# Patient Record
Sex: MALE | Race: WHITE | Employment: OTHER | URBAN - METROPOLITAN AREA
[De-identification: names, ages, dates, MRNs, and addresses within clinical notes are randomized per-mention and may not be internally consistent; named-entity substitution may affect disease eponyms.]

---

## 2017-10-10 ENCOUNTER — HOSPITAL ENCOUNTER (OUTPATIENT)
Age: 82
Setting detail: OUTPATIENT SURGERY
Discharge: HOME | End: 2017-10-10
Attending: COLON & RECTAL SURGERY | Admitting: COLON & RECTAL SURGERY
Payer: MEDICARE

## 2017-10-10 ENCOUNTER — ANESTHESIA EVENT (OUTPATIENT)
Dept: ENDOSCOPY | Age: 82
End: 2017-10-10
Payer: MEDICARE

## 2017-10-10 ENCOUNTER — ANESTHESIA (OUTPATIENT)
Dept: ENDOSCOPY | Age: 82
End: 2017-10-10
Payer: MEDICARE

## 2017-10-10 VITALS
WEIGHT: 243.56 LBS | OXYGEN SATURATION: 94 % | DIASTOLIC BLOOD PRESSURE: 71 MMHG | RESPIRATION RATE: 14 BRPM | TEMPERATURE: 97.6 F | HEIGHT: 75 IN | SYSTOLIC BLOOD PRESSURE: 125 MMHG | BODY MASS INDEX: 30.28 KG/M2 | HEART RATE: 89 BPM

## 2017-10-10 PROCEDURE — 77030009426 HC FCPS BIOP ENDOSC BSC -B: Performed by: COLON & RECTAL SURGERY

## 2017-10-10 PROCEDURE — 74011000250 HC RX REV CODE- 250

## 2017-10-10 PROCEDURE — 88305 TISSUE EXAM BY PATHOLOGIST: CPT | Performed by: COLON & RECTAL SURGERY

## 2017-10-10 PROCEDURE — 74011250636 HC RX REV CODE- 250/636

## 2017-10-10 PROCEDURE — 76060000031 HC ANESTHESIA FIRST 0.5 HR: Performed by: COLON & RECTAL SURGERY

## 2017-10-10 PROCEDURE — 74011250636 HC RX REV CODE- 250/636: Performed by: COLON & RECTAL SURGERY

## 2017-10-10 PROCEDURE — 76040000019: Performed by: COLON & RECTAL SURGERY

## 2017-10-10 RX ORDER — EPINEPHRINE 0.1 MG/ML
1 INJECTION INTRACARDIAC; INTRAVENOUS
Status: DISCONTINUED | OUTPATIENT
Start: 2017-10-10 | End: 2017-10-10 | Stop reason: HOSPADM

## 2017-10-10 RX ORDER — ATROPINE SULFATE 0.1 MG/ML
0.5 INJECTION INTRAVENOUS
Status: DISCONTINUED | OUTPATIENT
Start: 2017-10-10 | End: 2017-10-10 | Stop reason: HOSPADM

## 2017-10-10 RX ORDER — PROPOFOL 10 MG/ML
INJECTION, EMULSION INTRAVENOUS AS NEEDED
Status: DISCONTINUED | OUTPATIENT
Start: 2017-10-10 | End: 2017-10-10 | Stop reason: HOSPADM

## 2017-10-10 RX ORDER — SODIUM CHLORIDE 0.9 % (FLUSH) 0.9 %
5-10 SYRINGE (ML) INJECTION EVERY 8 HOURS
Status: DISCONTINUED | OUTPATIENT
Start: 2017-10-10 | End: 2017-10-10 | Stop reason: HOSPADM

## 2017-10-10 RX ORDER — SODIUM CHLORIDE 0.9 % (FLUSH) 0.9 %
5-10 SYRINGE (ML) INJECTION AS NEEDED
Status: DISCONTINUED | OUTPATIENT
Start: 2017-10-10 | End: 2017-10-10 | Stop reason: HOSPADM

## 2017-10-10 RX ORDER — FLUMAZENIL 0.1 MG/ML
0.2 INJECTION INTRAVENOUS
Status: DISCONTINUED | OUTPATIENT
Start: 2017-10-10 | End: 2017-10-10 | Stop reason: HOSPADM

## 2017-10-10 RX ORDER — DEXTROMETHORPHAN/PSEUDOEPHED 2.5-7.5/.8
1.2 DROPS ORAL
Status: DISCONTINUED | OUTPATIENT
Start: 2017-10-10 | End: 2017-10-10 | Stop reason: HOSPADM

## 2017-10-10 RX ORDER — SODIUM CHLORIDE 9 MG/ML
50 INJECTION, SOLUTION INTRAVENOUS CONTINUOUS
Status: DISCONTINUED | OUTPATIENT
Start: 2017-10-10 | End: 2017-10-10 | Stop reason: HOSPADM

## 2017-10-10 RX ORDER — LIDOCAINE HYDROCHLORIDE 20 MG/ML
INJECTION, SOLUTION EPIDURAL; INFILTRATION; INTRACAUDAL; PERINEURAL AS NEEDED
Status: DISCONTINUED | OUTPATIENT
Start: 2017-10-10 | End: 2017-10-10 | Stop reason: HOSPADM

## 2017-10-10 RX ORDER — METOPROLOL SUCCINATE 25 MG/1
25 TABLET, EXTENDED RELEASE ORAL 2 TIMES DAILY
COMMUNITY
End: 2019-07-03 | Stop reason: DRUGHIGH

## 2017-10-10 RX ORDER — NALOXONE HYDROCHLORIDE 0.4 MG/ML
0.4 INJECTION, SOLUTION INTRAMUSCULAR; INTRAVENOUS; SUBCUTANEOUS
Status: DISCONTINUED | OUTPATIENT
Start: 2017-10-10 | End: 2017-10-10 | Stop reason: HOSPADM

## 2017-10-10 RX ADMIN — SODIUM CHLORIDE 50 ML/HR: 900 INJECTION, SOLUTION INTRAVENOUS at 12:53

## 2017-10-10 RX ADMIN — LIDOCAINE HYDROCHLORIDE 40 MG: 20 INJECTION, SOLUTION EPIDURAL; INFILTRATION; INTRACAUDAL; PERINEURAL at 13:34

## 2017-10-10 RX ADMIN — PROPOFOL 180 MG: 10 INJECTION, EMULSION INTRAVENOUS at 13:50

## 2017-10-10 NOTE — BRIEF OP NOTE
BRIEF OPERATIVE NOTE    Date of Procedure: 10/10/2017   Preoperative Diagnosis: HX COLON POLYPS  Postoperative Diagnosis: diverticulosis, colon polyp    Procedure(s):  COLONOSCOPY  COLON BIOPSY  Surgeon(s) and Role:     * Brayan Mcmahon MD - Primary         Assistant Staff:       Surgical Staff:  Endoscopy Technician-1: Tegan Armas  Endoscopy RN-1: Alley Houser RN  No case tracking events are documented in the log.   Anesthesia: MAC   Estimated Blood Loss: none  Specimens:   ID Type Source Tests Collected by Time Destination   1 : Sigmoid Colon Polyp bx Preservative Sigmoid  Brayan Mcmahon MD 10/10/2017 1347 Pathology      Findings: sigmoid diverticulosis and polyp   Complications: none apparent  Implants: * No implants in log *

## 2017-10-10 NOTE — PROGRESS NOTES
Endoscope was pre-cleaned at the bedside immediately following procedure by [facility].

## 2017-10-10 NOTE — ANESTHESIA PREPROCEDURE EVALUATION
Anesthetic History   No history of anesthetic complications            Review of Systems / Medical History  Patient summary reviewed, nursing notes reviewed and pertinent labs reviewed    Pulmonary  Within defined limits                 Neuro/Psych   Within defined limits           Cardiovascular    Hypertension        Dysrhythmias : atrial flutter      Exercise tolerance: >4 METS     GI/Hepatic/Renal               Comments: HX COLON POLYPS Endo/Other        Obesity     Other Findings   Comments: Nocturia   Hx skin cancer          Physical Exam    Airway  Mallampati: II  TM Distance: 4 - 6 cm  Neck ROM: normal range of motion   Mouth opening: Normal     Cardiovascular  Regular rate and rhythm,  S1 and S2 normal,  no murmur, click, rub, or gallop             Dental    Dentition: Full upper dentures and Lower partial plate     Pulmonary  Breath sounds clear to auscultation               Abdominal  GI exam deferred       Other Findings            Anesthetic Plan    ASA: 3  Anesthesia type: total IV anesthesia          Induction: Intravenous  Anesthetic plan and risks discussed with: Patient

## 2017-10-10 NOTE — IP AVS SNAPSHOT
[address] [place] [address] 
[phone] Patient: Catherine Hdez MRN: [id number] ZSM:[id number] You are allergic to the following Allergen Reactions Aspirin Other (comments) Pt states not allergic to it. Recent Documentation Height Weight BMI Smoking Status 1.905 m 110.5 kg 30.44 kg/m2 Never Smoker Emergency Contacts Name Discharge Info Relation Home Work Mobile Peggy Salguero DISCHARGE CAREGIVER [3] Spouse [3] [phone] About your hospitalization You were admitted on:  October 10, 2017 You last received care in the:  [facility] You were discharged on:  October 10, 2017 Unit phone number:  [phone] Why you were hospitalized Your primary diagnosis was:  Not on File Providers Seen During Your Hospitalizations Provider Role Specialty Primary office phone Xi Wakefield MD Attending Provider Colon and Rectal Surgery [phone] Your Primary Care Physician (PCP) Primary Care Physician Office Phone Office Fax [facility] [phone] Follow-up Information Follow up With Details Comments [place]   [address] [address] 
[phone] Current Discharge Medication List  
  
CONTINUE these medications which have NOT CHANGED Dose & Instructions Dispensing Information Comments Morning Noon Evening Bedtime  
 aspirin delayed-release 81 mg tablet Your last dose was: Your next dose is:    
   
   
 Dose:  81 mg Take 81 mg by mouth daily. Refills:  0  
     
   
   
   
  
 metoprolol succinate 25 mg XL tablet Commonly known as:  TOPROL-XL Your last dose was: Your next dose is: Take  by mouth daily. Refills:  0 Discharge Instructions Gerald Marienlli 
[id number] 
1935 COLON DISCHARGE INSTRUCTIONS Discomfort: 
Redness at IV site- apply warm compress to area; if redness or soreness persist- contact your physician There may be a slight amount of blood passed from the rectum Gaseous discomfort- walking, belching will help relieve any discomfort You may not operate a vehicle for 12 hours You may not engage in an occupation involving machinery or appliances for rest of today You may not drink alcoholic beverages for at least 12 hours Avoid making any critical decisions for at least 24 hour DIET: 
 High fiber diet.  however -  remember your colon is empty and a heavy meal will produce gas. Avoid these foods:  vegetables, fried / greasy foods, carbonated drinks for today MEDICATIONS: 
Avoid blood thinners for one week ACTIVITY: 
You may resume your normal daily activities it is recommended that you spend the remainder of the day resting -  avoid any strenuous activity. CALL M.D. ANY SIGN OF: Increasing pain, nausea, vomiting Abdominal distension (swelling) New increased bleeding (oral or rectal) Fever (chills) Follow-up Instructions: 
 Call Leah Kay MD if any questions or problems. Telephone # [phone] Biopsy results will be available in  7 to10 days Should have a repeat colonoscopy in 5 years. COLONOSCOPY FINDINGS: 
Your colonoscopy showed: sigmoid diverticulosis and polyp. "[employer] Activation Thank you for requesting access to "[employer]. Please follow the instructions below to securely access and download your online medical record. "[employer] allows you to send messages to your doctor, view your test results, renew your prescriptions, schedule appointments, and more. How Do I Sign Up? 1. In your internet browser, go to [email or web address] 
2. Click on the First Time User? Click Here link in the Sign In box. You will be redirect to the New Member Sign Up page. 3. Enter your [employer] Access Code exactly as it appears below. You will not need to use this code after youve completed the sign-up process. If you do not sign up before the expiration date, you must request a new code. [employer] Access Code: [email or web address] Expires: 2018 11:56 AM (This is the date your [employer] access code will ) 4. Enter the last four digits of your Social Security Number (xxxx) and Date of Birth (mm/dd/yyyy) as indicated and click Submit. You will be taken to the next sign-up page. 5. Create a [employer]o ID. This will be your [employer] login ID and cannot be changed, so think of one that is secure and easy to remember. 6. Create a [employer] password. You can change your password at any time. 7. Enter your Password Reset Question and Answer. This can be used at a later time if you forget your password. 8. Enter your e-mail address. You will receive e-mail notification when new information is available in [address] 9. Click Sign Up. You can now view and download portions of your medical record. 10. Click the Download Summary menu link to download a portable copy of your medical information. Additional Information If you have questions, please visit the Frequently Asked Questions section of the [employer] website at [email or web address] Remember, [employer] is NOT to be used for urgent needs. For medical emergencies, dial 911. Discharge Orders None Introducing [facility]! Jessica Chowdhury introduces [employer] patient portal. Now you can access parts of your medical record, email your doctor's office, and request medication refills online. 1. In your internet browser, go to [email or web address] [employer] 2. Click on the First Time User? Click Here link in the Sign In box. You will see the New Member Sign Up page. 3. Enter your [employer] Access Code exactly as it appears below.  You will not need to use this code after youve completed the sign-up process. If you do not sign up before the expiration date, you must request a new code. · [employer] Access Code: [email or web address] Expires: 1/8/2018 11:56 AM 
 
4. Enter the last four digits of your Social Security Number (xxxx) and Date of Birth (mm/dd/yyyy) as indicated and click Submit. You will be taken to the next sign-up page. 5. Create a [employer] ID. This will be your [employer] login ID and cannot be changed, so think of one that is secure and easy to remember. 6. Create a [employer] password. You can change your password at any time. 7. Enter your Password Reset Question and Answer. This can be used at a later time if you forget your password. 8. Enter your e-mail address. You will receive e-mail notification when new information is available in [address] 9. Click Sign Up. You can now view and download portions of your medical record. 10. Click the Download Summary menu link to download a portable copy of your medical information. If you have questions, please visit the Frequently Asked Questions section of the [employer] website. Remember, [employer] is NOT to be used for urgent needs. For medical emergencies, dial 911. Now available from your iPhone and Android! General Information Please provide this summary of care documentation to your next provider. Patient Signature:  ____________________________________________________________ Date:  ____________________________________________________________  
  
Veronica Newcomer Provider Signature:  ____________________________________________________________ Date:  ____________________________________________________________

## 2017-10-10 NOTE — DISCHARGE INSTRUCTIONS
Renetta Briones  [id number]  1935    COLON DISCHARGE INSTRUCTIONS  Discomfort:  Redness at IV site- apply warm compress to area; if redness or soreness persist- contact your physician  There may be a slight amount of blood passed from the rectum  Gaseous discomfort- walking, belching will help relieve any discomfort  You may not operate a vehicle for 12 hours  You may not engage in an occupation involving machinery or appliances for rest of today  You may not drink alcoholic beverages for at least 12 hours  Avoid making any critical decisions for at least 24 hour  DIET:   High fiber diet. - however -  remember your colon is empty and a heavy meal will produce gas. Avoid these foods:  vegetables, fried / greasy foods, carbonated drinks for today    MEDICATIONS:  Avoid blood thinners for one week       ACTIVITY:  You may resume your normal daily activities it is recommended that you spend the remainder of the day resting -  avoid any strenuous activity. CALL M.D. ANY SIGN OF:   Increasing pain, nausea, vomiting  Abdominal distension (swelling)  New increased bleeding (oral or rectal)  Fever (chills)     Follow-up Instructions:   Call Luisana Vallejo MD if any questions or problems. Telephone # [phone]  Biopsy results will be available in  7 to10 days  Should have a repeat colonoscopy in 5 years. COLONOSCOPY FINDINGS:  Your colonoscopy showed: sigmoid diverticulosis and polyp. [employer] Activation    Thank you for requesting access to [employer]. Please follow the instructions below to securely access and download your online medical record. [employer] allows you to send messages to your doctor, view your test results, renew your prescriptions, schedule appointments, and more. How Do I Sign Up? 1. In your internet browser, go to [email or web address]  2. Click on the First Time User? Click Here link in the Sign In box. You will be redirect to the New Member Sign Up page.   3. Enter your [employer] Access Code exactly as it appears below. You will not need to use this code after youve completed the sign-up process. If you do not sign up before the expiration date, you must request a new code. [employer] Access Code: [email or web address]  Expires: 2018 11:56 AM (This is the date your [employer] access code will )    4. Enter the last four digits of your Social Security Number (xxxx) and Date of Birth (mm/dd/yyyy) as indicated and click Submit. You will be taken to the next sign-up page. 5. Create a [employer] ID. This will be your [employer] login ID and cannot be changed, so think of one that is secure and easy to remember. 6. Create a [employer] password. You can change your password at any time. 7. Enter your Password Reset Question and Answer. This can be used at a later time if you forget your password. 8. Enter your e-mail address. You will receive e-mail notification when new information is available in [address] 9. Click Sign Up. You can now view and download portions of your medical record. 10. Click the Download Summary menu link to download a portable copy of your medical information. Additional Information    If you have questions, please visit the Frequently Asked Questions section of the [employer] website at [email or web address] Remember, [employer] is NOT to be used for urgent needs. For medical emergencies, dial 911.

## 2017-10-10 NOTE — H&P
Colon and Rectal Surgery History and Physical    Subjective:      Leah Lundberg is a 80 y.o. male who has hx phi    There are no active problems to display for this patient. Past Medical History:   Diagnosis Date    Arrhythmia     atrial flutter 2015    Cancer (HCC)     skin cancer basal cell    Nocturia       Past Surgical History:   Procedure Laterality Date    COLONOSCOPY N/A 9/27/2016    COLONOSCOPY performed by Abbie Alcazar MD at [facility] ENDOSCOPY    HX HEENT      rt eye removal of foreign object    HX HEENT      skin cancer removed on head and arms      Social History   Substance Use Topics    Smoking status: Never Smoker    Smokeless tobacco: Not on file    Alcohol use No      Family History   Problem Relation Age of Onset    Cancer Mother       Prior to Admission medications    Medication Sig Start Date End Date Taking? Authorizing Provider   metoprolol succinate (TOPROL-XL) 25 mg XL tablet Take  by mouth daily. Yes Historical Provider   aspirin delayed-release 81 mg tablet Take 81 mg by mouth daily. Yes Historical Provider     Allergies   Allergen Reactions    Aspirin Other (comments)     Pt states not allergic to it. Review of Systems:    A comprehensive review of systems was negative except for that written in the History of Present Illness. Objective:     Visit Vitals    BP (!) 131/101    Pulse (!) 107    Temp 98.1 °F (36.7 °C)    Resp 18    Ht 6' 3\" (1.905 m)    Wt 110.5 kg (243 lb 9 oz)    SpO2 96%    BMI 30.44 kg/m2        Physical Exam:   nad  Chest clear  Heart reg  Ab dsoft    Imaging:  images and reports reviewed    Lab Review:  No results found for this or any previous visit (from the past 24 hour(s)). Labs and radiology: images and reports reviewed      Assessment:     Hx phi    Plan:     1. I recommend proceeding with colonoscopy. Treatment alternatives were discussed.   2. Discussed aspects of surgical intervention, methods, risks (including by not limited to infection, bleeding, hematoma, and perforation of the intestines or solid organs), and the risks of general anesthetic. The patient understands the risks; any and all questions were answered to the patient's satisfaction.     Signed By: Dawna Lara MD     October 10, 2017

## 2017-10-10 NOTE — PERIOP NOTES
[facility]  1935  [id number]    Situation:  Verbal report received from: Pepe Villagran RN  Procedure: Procedure(s):  COLONOSCOPY  COLON BIOPSY    Background:    Preoperative diagnosis: HX COLON POLYPS  Postoperative diagnosis: diverticulosis, colon polyp    :  Dr. Cheryl Eckert  Assistant(s): Endoscopy Technician-1: Yamileth Parker  Endoscopy RN-1: Paxton Parmar RN    Specimens:   ID Type Source Tests Collected by Time Destination   1 : Sigmoid Colon Polyp bx Preservative Sigmoid  Margret Hoyt MD 10/10/2017 1347 Pathology     H. Pylori  no    Assessment:  Intra-procedure medications     Anesthesia gave intra-procedure sedation and medications, see anesthesia flow sheet yes    Intravenous fluids: NS@ KVO     Vital signs stable     Abdominal assessment: round and soft     Recommendation:  Discharge patient per MD order.   Family or Friend   Permission to share finding with family or friend yes

## 2017-10-10 NOTE — OP NOTES
Colonoscopy Procedure Note    Indications: Previous adenomatous polyp    Anesthesia/Sedation: MAC anesthesia Propofol    Pre-Procedure Exam:  Airway: clear   Heart: normal S1and S2    Lungs: clear bilateral  Abdomen: soft, nontender, bowel sounds present and normal in all quadrants   Mental Status: awake, alert, and oriented to person, place, and time      Procedure in Detail:  Informed consent was obtained for the procedure, including sedation. Risks of perforation, hemorrhage, adverse drug reaction, and aspiration were discussed. The patient was placed in the left lateral decubitus position. Based on the pre-procedure assessment, including review of the patient's medical history, medications, allergies, and review of systems, he had been deemed to be an appropriate candidate for moderate sedation; he was therefore sedated with the medications listed above. The patient was monitored continuously with ECG tracing, pulse oximetry, blood pressure monitoring, and direct observations. A rectal examination was performed. The [id number] was inserted into the rectum and advanced under direct vision to the cecum, which was identified by the ileocecal valve and appendiceal orifice. The quality of the colonic preparation was excellent. A careful inspection was made as the colonoscope was withdrawn, including a retroflexed view of the rectum; findings and interventions are described below. Appropriate photodocumentation was obtained. Findings:   Rectum:   Normal  Sigmoid:     - Excavated lesions:     - Diverticulosis    - Protruding lesions:     -Pedunculated Polyp(s) size 5-7 mm removed by polypectomy (hot biopsy)  Descending Colon:   Normal  Transverse Colon:   Normal  Ascending Colon:   Normal  Cecum:   Normal            Specimens: Specimens were collected and sent to pathology. EBL: None    Complications: None; patient tolerated the procedure well.     Attending Attestation: I performed the procedure. Recommendations:   - Repeat colonoscopy in 5 years.     Signed By: Bairon Cook MD                        October 10, 2017

## 2017-10-10 NOTE — ANESTHESIA POSTPROCEDURE EVALUATION
Post-Anesthesia Evaluation and Assessment    Patient: Cyndi Whyte MRN: [id number]  SSN: [id number]    YOB: 1935  Age: 80 y.o. Sex: male       Cardiovascular Function/Vital Signs  Visit Vitals    /73    Pulse 85    Temp 36.7 °C (98.1 °F)    Resp 16    Ht 6' 3\" (1.905 m)    Wt 110.5 kg (243 lb 9 oz)    SpO2 98%    BMI 30.44 kg/m2       Patient is status post total IV anesthesia anesthesia for Procedure(s):  COLONOSCOPY  COLON BIOPSY. Nausea/Vomiting: None    Postoperative hydration reviewed and adequate. Pain:  Pain Scale 1: Numeric (0 - 10) (10/10/17 1242)  Pain Intensity 1: 0 (10/10/17 1242)   Managed    Neurological Status: At baseline    Mental Status and Level of Consciousness: Arousable    Pulmonary Status:   O2 Device: CO2 nasal cannula (10/10/17 1351)   Adequate oxygenation and airway patent    Complications related to anesthesia: None    Post-anesthesia assessment completed.  No concerns    Signed By: Juan R Knox MD     October 10, 2017

## 2017-10-10 NOTE — PROGRESS NOTES
Anesthesia reports 180mg Propofol, 40 mg Lidocaine and 300 mL NS given during procedure. Received report from anesthesia staff on vital signs and status of patient.

## 2019-07-03 ENCOUNTER — APPOINTMENT (OUTPATIENT)
Dept: GENERAL RADIOLOGY | Age: 84
End: 2019-07-03
Attending: EMERGENCY MEDICINE
Payer: MEDICARE

## 2019-07-03 ENCOUNTER — HOSPITAL ENCOUNTER (EMERGENCY)
Age: 84
Discharge: HOME | End: 2019-07-03
Attending: EMERGENCY MEDICINE
Payer: MEDICARE

## 2019-07-03 VITALS
TEMPERATURE: 98.6 F | WEIGHT: 254 LBS | RESPIRATION RATE: 21 BRPM | BODY MASS INDEX: 31.75 KG/M2 | OXYGEN SATURATION: 94 % | SYSTOLIC BLOOD PRESSURE: 123 MMHG | DIASTOLIC BLOOD PRESSURE: 60 MMHG | HEART RATE: 80 BPM

## 2019-07-03 DIAGNOSIS — I20.8 OTHER FORMS OF ANGINA PECTORIS (HCC): Primary | ICD-10-CM

## 2019-07-03 DIAGNOSIS — K21.9 GASTROESOPHAGEAL REFLUX DISEASE, ESOPHAGITIS PRESENCE NOT SPECIFIED: ICD-10-CM

## 2019-07-03 LAB
ALBUMIN SERPL-MCNC: 3.7 G/DL (ref 3.5–5)
ALBUMIN/GLOB SERPL: 1 {RATIO} (ref 1.1–2.2)
ALP SERPL-CCNC: 101 U/L (ref 45–117)
ALT SERPL-CCNC: 17 U/L (ref 12–78)
ANION GAP SERPL CALC-SCNC: 5 MMOL/L (ref 5–15)
AST SERPL-CCNC: 12 U/L (ref 15–37)
ATRIAL RATE: 277 BPM
BASOPHILS # BLD: 0 K/UL (ref 0–0.1)
BASOPHILS NFR BLD: 0 % (ref 0–1)
BILIRUB SERPL-MCNC: 1.3 MG/DL (ref 0.2–1)
BUN SERPL-MCNC: 21 MG/DL (ref 6–20)
BUN/CREAT SERPL: 16 (ref 12–20)
CALCIUM SERPL-MCNC: 8.8 MG/DL (ref 8.5–10.1)
CALCULATED R AXIS, ECG10: -39 DEGREES
CALCULATED T AXIS, ECG11: 39 DEGREES
CHLORIDE SERPL-SCNC: 105 MMOL/L (ref 97–108)
CK MB CFR SERPL CALC: NORMAL % (ref 0–2.5)
CK MB SERPL-MCNC: <1 NG/ML (ref 5–25)
CK SERPL-CCNC: 41 U/L (ref 39–308)
CO2 SERPL-SCNC: 29 MMOL/L (ref 21–32)
CREAT SERPL-MCNC: 1.34 MG/DL (ref 0.7–1.3)
DIAGNOSIS, 93000: NORMAL
DIFFERENTIAL METHOD BLD: ABNORMAL
EOSINOPHIL # BLD: 0 K/UL (ref 0–0.4)
EOSINOPHIL NFR BLD: 0 % (ref 0–7)
ERYTHROCYTE [DISTWIDTH] IN BLOOD BY AUTOMATED COUNT: 14.3 % (ref 11.5–14.5)
GLOBULIN SER CALC-MCNC: 3.7 G/DL (ref 2–4)
GLUCOSE SERPL-MCNC: 140 MG/DL (ref 65–100)
HCT VFR BLD AUTO: 50.4 % (ref 36.6–50.3)
HGB BLD-MCNC: 16.4 G/DL (ref 12.1–17)
IMM GRANULOCYTES # BLD AUTO: 0.1 K/UL (ref 0–0.04)
IMM GRANULOCYTES NFR BLD AUTO: 0 % (ref 0–0.5)
LYMPHOCYTES # BLD: 1.5 K/UL (ref 0.8–3.5)
LYMPHOCYTES NFR BLD: 11 % (ref 12–49)
MCH RBC QN AUTO: 30.3 PG (ref 26–34)
MCHC RBC AUTO-ENTMCNC: 32.5 G/DL (ref 30–36.5)
MCV RBC AUTO: 93 FL (ref 80–99)
MONOCYTES # BLD: 2 K/UL (ref 0–1)
MONOCYTES NFR BLD: 14 % (ref 5–13)
NEUTS SEG # BLD: 10 K/UL (ref 1.8–8)
NEUTS SEG NFR BLD: 75 % (ref 32–75)
NRBC # BLD: 0 K/UL (ref 0–0.01)
NRBC BLD-RTO: 0 PER 100 WBC
PLATELET # BLD AUTO: 126 K/UL (ref 150–400)
PMV BLD AUTO: 9.7 FL (ref 8.9–12.9)
POTASSIUM SERPL-SCNC: 4.3 MMOL/L (ref 3.5–5.1)
PROT SERPL-MCNC: 7.4 G/DL (ref 6.4–8.2)
Q-T INTERVAL, ECG07: 338 MS
QRS DURATION, ECG06: 84 MS
QTC CALCULATION (BEZET), ECG08: 415 MS
RBC # BLD AUTO: 5.42 M/UL (ref 4.1–5.7)
SODIUM SERPL-SCNC: 139 MMOL/L (ref 136–145)
TROPONIN I SERPL-MCNC: <0.05 NG/ML
TROPONIN I SERPL-MCNC: <0.05 NG/ML
VENTRICULAR RATE, ECG03: 91 BPM
WBC # BLD AUTO: 13.6 K/UL (ref 4.1–11.1)

## 2019-07-03 PROCEDURE — 84484 ASSAY OF TROPONIN QUANT: CPT

## 2019-07-03 PROCEDURE — 99285 EMERGENCY DEPT VISIT HI MDM: CPT

## 2019-07-03 PROCEDURE — 36415 COLL VENOUS BLD VENIPUNCTURE: CPT

## 2019-07-03 PROCEDURE — 74011000250 HC RX REV CODE- 250: Performed by: EMERGENCY MEDICINE

## 2019-07-03 PROCEDURE — 82550 ASSAY OF CK (CPK): CPT

## 2019-07-03 PROCEDURE — 80053 COMPREHEN METABOLIC PANEL: CPT

## 2019-07-03 PROCEDURE — 93005 ELECTROCARDIOGRAM TRACING: CPT

## 2019-07-03 PROCEDURE — 85025 COMPLETE CBC W/AUTO DIFF WBC: CPT

## 2019-07-03 PROCEDURE — 74011250637 HC RX REV CODE- 250/637: Performed by: EMERGENCY MEDICINE

## 2019-07-03 PROCEDURE — 71046 X-RAY EXAM CHEST 2 VIEWS: CPT

## 2019-07-03 RX ORDER — METOPROLOL SUCCINATE 25 MG/1
50 TABLET, EXTENDED RELEASE ORAL 2 TIMES DAILY
COMMUNITY

## 2019-07-03 RX ORDER — NITROGLYCERIN 0.4 MG/1
0.4 TABLET SUBLINGUAL
Status: DISCONTINUED | OUTPATIENT
Start: 2019-07-03 | End: 2019-07-03 | Stop reason: HOSPADM

## 2019-07-03 RX ORDER — FLUOROURACIL 50 MG/G
CREAM TOPICAL
COMMUNITY

## 2019-07-03 RX ADMIN — ALUMINUM HYDROXIDE AND MAGNESIUM HYDROXIDE 40 ML: 200; 200 SUSPENSION ORAL at 12:00

## 2019-07-03 NOTE — DISCHARGE INSTRUCTIONS
Patient Education        Angina: Care Instructions  Your Care Instructions    You have a problem called angina. Angina happens when there is not enough blood flow to your heart muscle. Angina is a sign of coronary artery disease (CAD). CAD occurs when blood vessels that supply the heart become narrowed. Having CAD increases your risk of a heart attack. Chest pain or pressure is the most common symptom of angina. But some people have other symptoms, like:  · Pain, pressure, or a strange feeling in the back, neck, jaw, or upper belly, or in one or both shoulders or arms. · Shortness of breath. · Nausea or vomiting. · Lightheadedness or sudden weakness. · Fast or irregular heartbeat. Women are somewhat more likely than men to have angina symptoms like shortness of breath, nausea, and back or jaw pain. Angina can be dangerous. That's why it is important to pay attention to your symptoms. Know what is typical for you, learn how to control your symptoms, and understand when you need to get treatment. A change in your usual pattern of symptoms is an emergency. It may mean that you are having a heart attack. The doctor has checked you carefully, but problems can develop later. If you notice any problems or new symptoms, get medical treatment right away. Follow-up care is a key part of your treatment and safety. Be sure to make and go to all appointments, and call your doctor if you are having problems. It's also a good idea to know your test results and keep a list of the medicines you take. How can you care for yourself at home? Medicines    · If your doctor has given you nitroglycerin for angina symptoms, keep it with you at all times. If you have symptoms, sit down and rest, and take the first dose of nitroglycerin as directed. If your symptoms get worse or are not getting better within 5 minutes, call 911 right away. Stay on the phone.  The emergency  will give you further instructions.     · If your doctor advises it, take 1 low-dose aspirin a day to prevent heart attack.     · Be safe with medicines. Take your medicines exactly as prescribed. Call your doctor if you think you are having a problem with your medicine. You will get more details on the specific medicines your doctor prescribes.    Lifestyle changes    · Do not smoke. If you need help quitting, talk to your doctor about stop-smoking programs and medicines. These can increase your chances of quitting for good.     · Eat a heart-healthy diet that is low in saturated fat and salt, and is high in fiber. Talk to your doctor or a dietitian about healthy eating.     · Stay at a healthy weight. Or lose weight if you need to. Activity    · Talk to your doctor about a level of activity that is safe for you.     · If an activity causes angina symptoms, stop and rest.   When should you call for help? Call 911 anytime you think you may need emergency care. For example, call if:    · You passed out (lost consciousness).     · You have symptoms of a heart attack. These may include:  ? Chest pain or pressure, or a strange feeling in the chest.  ? Sweating. ? Shortness of breath. ? Nausea or vomiting. ? Pain, pressure, or a strange feeling in the back, neck, jaw, or upper belly or in one or both shoulders or arms. ? Lightheadedness or sudden weakness. ? A fast or irregular heartbeat. After you call 911, the  may tell you to chew 1 adult-strength or 2 to 4 low-dose aspirin. Wait for an ambulance.  Do not try to drive yourself.     · You have angina symptoms that do not go away with rest or are not getting better within 5 minutes after you take a dose of nitroglycerin.    Call your doctor now or seek immediate medical care if:    · You are having angina symptoms more often than usual, or they are different or worse than usual.     · You feel dizzy or lightheaded, or you feel like you may faint.    Watch closely for changes in your health, and be sure to contact your doctor if you have any problems. Where can you learn more? Go to http://keiry-pita.info/. Enter H129 in the search box to learn more about \"Angina: Care Instructions. \"  Current as of: July 22, 2018  Content Version: 11.9  © 0937-2188 [employer]. Care instructions adapted under license by [employer] (which disclaims liability or warranty for this information). If you have questions about a medical condition or this instruction, always ask your healthcare professional. [place] [address] any warranty or liability for your use of this information. Patient Education        Gastroesophageal Reflux Disease (GERD): Care Instructions  Your Care Instructions    Gastroesophageal reflux disease (GERD) is the backward flow of stomach acid into the esophagus. The esophagus is the tube that leads from your throat to your stomach. A one-way valve prevents the stomach acid from moving up into this tube. When you have GERD, this valve does not close tightly enough. If you have mild GERD symptoms including heartburn, you may be able to control the problem with antacids or over-the-counter medicine. Changing your diet, losing weight, and making other lifestyle changes can also help reduce symptoms. Follow-up care is a key part of your treatment and safety. Be sure to make and go to all appointments, and call your doctor if you are having problems. It's also a good idea to know your test results and keep a list of the medicines you take. How can you care for yourself at home? · Take your medicines exactly as prescribed. Call your doctor if you think you are having a problem with your medicine. · Your doctor may recommend over-the-counter medicine. For mild or occasional indigestion, antacids, such as Tums, Gaviscon, Mylanta, or Maalox, may help.  Your doctor also may recommend over-the-counter acid reducers, such as Pepcid AC, Tagamet HB, Zantac 75, or Prilosec. Read and follow all instructions on the label. If you use these medicines often, talk with your doctor. · Change your eating habits. ? It's best to eat several small meals instead of two or three large meals. ? After you eat, wait 2 to 3 hours before you lie down. ? Chocolate, mint, and alcohol can make GERD worse. ? Spicy foods, foods that have a lot of acid (like tomatoes and oranges), and coffee can make GERD symptoms worse in some people. If your symptoms are worse after you eat a certain food, you may want to stop eating that food to see if your symptoms get better. · Do not smoke or chew tobacco. Smoking can make GERD worse. If you need help quitting, talk to your doctor about stop-smoking programs and medicines. These can increase your chances of quitting for good. · If you have GERD symptoms at night, raise the head of your bed 6 to 8 inches by putting the frame on blocks or placing a foam wedge under the head of your mattress. (Adding extra pillows does not work.)  · Do not wear tight clothing around your middle. · Lose weight if you need to. Losing just 5 to 10 pounds can help. When should you call for help? Call your doctor now or seek immediate medical care if:    · You have new or different belly pain.     · Your stools are black and tarlike or have streaks of blood.    Watch closely for changes in your health, and be sure to contact your doctor if:    · Your symptoms have not improved after 2 days.     · Food seems to catch in your throat or chest.   Where can you learn more? Go to http://keiry-pita.info/. Enter I561 in the search box to learn more about \"Gastroesophageal Reflux Disease (GERD): Care Instructions. \"  Current as of: March 27, 2018  Content Version: 11.9  © 2697-1980 [employer]. Care instructions adapted under license by [employer] (which disclaims liability or warranty for this information).  If you have questions about a medical condition or this instruction, always ask your healthcare professional. [place] [address] any warranty or liability for your use of this information.

## 2019-07-03 NOTE — ED NOTES
Pt resting on stretcher Wife and daughter bedside Pt updated on plan with pending dispo following labs. Pt denies any needs/wants at this time.

## 2019-07-03 NOTE — ED PROVIDER NOTES
EMERGENCY DEPARTMENT HISTORY AND PHYSICAL EXAM      Date: 7/3/2019  Patient Name: Kira Hernandez  Patient Age and Sex: 80 y.o. male    History of Presenting Illness     Chief Complaint   Patient presents with    Chest Pain     Arrives via EMS from PCP office for chest tightness x last night with SOB Went to PCP who referred him to ED today       History Provided By: Patient    HPI: Kira Hernandez, 80 y.o. male with PMHx significant for afib  presents to the ED with substernal chest pain that radiates to his left arm and started last night but he was playing with his dog. The pain has improved since, but he went this morning to his primary care doctor sent him here for further evaluation. He still reports mild substernal chest pressure. No other associated symptoms. The pain is pressure/dull like, no alleviating or exacerbating symptoms. He feels like belching might improve it and thought he had gas. He walks daily 3 miles and generally does not have chest pain or shortness of breath during exertion. Past Medical History:   Diagnosis Date    Arrhythmia     atrial flutter 2015    Cancer (HCC)     skin cancer basal cell    Nocturia      Past Surgical History:   Procedure Laterality Date    COLONOSCOPY N/A 9/27/2016    COLONOSCOPY performed by [facility]lee Edgar MD at [facility] ENDOSCOPY    COLONOSCOPY N/A 10/10/2017    COLONOSCOPY performed by Karlee Edgar MD at [facility] ENDOSCOPY    HX HEENT      rt eye removal of foreign object    HX HEENT      skin cancer removed on head and arms       Pt denies any other alleviating or exacerbating factors. Additionally, pt specifically denies any recent fever, chills, headache, nausea, vomiting, abdominal pain,SOB, lightheadedness, dizziness, numbness, weakness, BLE swelling, heart palpitations, urinary sxs, diarrhea, constipation, melena, hematochezia, cough, or congestion.      PCP: Josy Lezama MD    There are no other complaints, changes or physical findings at this time. Past History   Past Medical History:  Past Medical History:   Diagnosis Date    Arrhythmia     atrial flutter 2015    Cancer ([facility] [address]     skin cancer basal cell    Nocturia        Past Surgical History:  Past Surgical History:   Procedure Laterality Date    COLONOSCOPY N/A 9/27/2016    COLONOSCOPY performed by Karlee Edgar MD at [facility] ENDOSCOPY    COLONOSCOPY N/A 10/10/2017    COLONOSCOPY performed by Karlee Edgar MD at [facility] ENDOSCOPY    HX HEENT      rt eye removal of foreign object    HX HEENT      skin cancer removed on head and arms       Family History:  Family History   Problem Relation Age of Onset    Cancer Mother        Social History:  Social History     Tobacco Use    Smoking status: Never Smoker   Substance Use Topics    Alcohol use: No     Alcohol/week: 0.0 oz    Drug use: Not on file       Allergies: Allergies   Allergen Reactions    Aspirin Other (comments)     Pt states not allergic to it. Current Medications:  No current facility-administered medications on file prior to encounter. Current Outpatient Medications on File Prior to Encounter   Medication Sig Dispense Refill    metoprolol succinate (TOPROL-XL) 25 mg XL tablet Take  by mouth daily.  aspirin delayed-release 81 mg tablet Take 81 mg by mouth daily. Review of Systems   Review of Systems   Constitutional: Negative for appetite change, chills and fever. Respiratory: Negative for cough, chest tightness and shortness of breath. Cardiovascular: Negative for chest pain, palpitations and leg swelling. Gastrointestinal: Negative for abdominal distention, abdominal pain, blood in stool, constipation, diarrhea, nausea and vomiting. Genitourinary: Negative for decreased urine volume, difficulty urinating, dysuria, flank pain, frequency and hematuria. Musculoskeletal: Negative for arthralgias, joint swelling, myalgias, neck pain and neck stiffness.    Neurological: Negative for dizziness, weakness, light-headedness, numbness and headaches. Hematological: Negative for adenopathy. All other systems reviewed and are negative. Physical Exam   Physical Exam   Constitutional: He is oriented to person, place, and time. He appears well-developed and well-nourished. No distress. HENT:   Head: Atraumatic. Mouth/Throat: Oropharynx is clear and moist.   Eyes: Pupils are equal, round, and reactive to light. Conjunctivae and EOM are normal. No scleral icterus. Neck: Normal range of motion. Neck supple. No JVD present. Cardiovascular: Normal rate, regular rhythm, normal heart sounds and intact distal pulses. Pulmonary/Chest: Effort normal and breath sounds normal. He exhibits no tenderness. Abdominal: Soft. Bowel sounds are normal. He exhibits no distension. There is no tenderness. Musculoskeletal: Normal range of motion. He exhibits no edema. Neurological: He is alert and oriented to person, place, and time. No cranial nerve deficit. Skin: Skin is warm and dry. He is not diaphoretic. Nursing note and vitals reviewed.       Diagnostic Study Results     Labs -  Recent Results (from the past 60 hour(s))   EKG, 12 LEAD, INITIAL    Collection Time: 07/03/19 10:33 AM   Result Value Ref Range    Ventricular Rate 91 BPM    Atrial Rate 277 BPM    QRS Duration 84 ms    Q-T Interval 338 ms    QTC Calculation (Bezet) 415 ms    Calculated R Axis -39 degrees    Calculated T Axis 39 degrees    Diagnosis       Atrial flutter with variable AV block  Left axis deviation  Cannot rule out Anterior infarct , age undetermined  When compared with ECG of 20-APR-2015 08:48,  Atrial flutter has replaced Sinus rhythm  Confirmed by Abdiel Bauer ([id number]) on 7/3/2019 2:04:35 PM     CBC WITH AUTOMATED DIFF    Collection Time: 07/03/19 10:41 AM   Result Value Ref Range    WBC 13.6 (H) 4.1 - 11.1 K/uL    RBC 5.42 4.10 - 5.70 M/uL    HGB 16.4 12.1 - 17.0 g/dL    HCT 50.4 (H) 36.6 - 50.3 %    MCV 93.0 80.0 - 99.0 FL    MCH 30.3 26.0 - 34.0 PG    MCHC 32.5 30.0 - 36.5 g/dL    RDW 14.3 11.5 - 14.5 %    PLATELET 727 (L) 677 - 400 K/uL    MPV 9.7 8.9 - 12.9 FL    NRBC 0.0 0  WBC    ABSOLUTE NRBC 0.00 0.00 - 0.01 K/uL    NEUTROPHILS 75 32 - 75 %    LYMPHOCYTES 11 (L) 12 - 49 %    MONOCYTES 14 (H) 5 - 13 %    EOSINOPHILS 0 0 - 7 %    BASOPHILS 0 0 - 1 %    IMMATURE GRANULOCYTES 0 0.0 - 0.5 %    ABS. NEUTROPHILS 10.0 (H) 1.8 - 8.0 K/UL    ABS. LYMPHOCYTES 1.5 0.8 - 3.5 K/UL    ABS. MONOCYTES 2.0 (H) 0.0 - 1.0 K/UL    ABS. EOSINOPHILS 0.0 0.0 - 0.4 K/UL    ABS. BASOPHILS 0.0 0.0 - 0.1 K/UL    ABS. IMM. GRANS. 0.1 (H) 0.00 - 0.04 K/UL    DF AUTOMATED     METABOLIC PANEL, COMPREHENSIVE    Collection Time: 07/03/19 10:41 AM   Result Value Ref Range    Sodium 139 136 - 145 mmol/L    Potassium 4.3 3.5 - 5.1 mmol/L    Chloride 105 97 - 108 mmol/L    CO2 29 21 - 32 mmol/L    Anion gap 5 5 - 15 mmol/L    Glucose 140 (H) 65 - 100 mg/dL    BUN 21 (H) 6 - 20 MG/DL    Creatinine 1.34 (H) 0.70 - 1.30 MG/DL    BUN/Creatinine ratio 16 12 - 20      GFR est AA >60 >60 ml/min/1.73m2    GFR est non-AA 51 (L) >60 ml/min/1.73m2    Calcium 8.8 8.5 - 10.1 MG/DL    Bilirubin, total 1.3 (H) 0.2 - 1.0 MG/DL    ALT (SGPT) 17 12 - 78 U/L    AST (SGOT) 12 (L) 15 - 37 U/L    Alk.  phosphatase 101 45 - 117 U/L    Protein, total 7.4 6.4 - 8.2 g/dL    Albumin 3.7 3.5 - 5.0 g/dL    Globulin 3.7 2.0 - 4.0 g/dL    A-G Ratio 1.0 (L) 1.1 - 2.2     CK W/ CKMB & INDEX    Collection Time: 07/03/19 10:41 AM   Result Value Ref Range    CK 41 39 - 308 U/L    CK - MB <1.0 <3.6 NG/ML    CK-MB Index Cannot be calculated 0.0 - 2.5     TROPONIN I    Collection Time: 07/03/19 10:41 AM   Result Value Ref Range    Troponin-I, Qt. <0.05 <0.05 ng/mL   TROPONIN I    Collection Time: 07/03/19  1:32 PM   Result Value Ref Range    Troponin-I, Qt. <0.05 <0.05 ng/mL       Radiologic Studies -   XR CHEST PA LAT   Final Result   IMPRESSION: Cardiomegaly with lung nodules. CT Results  (Last 48 hours)    None        CXR Results  (Last 48 hours)    None          Medical Decision Making   I am the first provider for this patient. I reviewed the vital signs, available nursing notes, past medical history, past surgical history, family history and social history. Vital Signs-Reviewed the patient's vital signs. Pulse Oximetry Analysis - 100% on RA    Cardiac Monitor:   Rate: 82 bpm  Rhythm: Atrial Flutter      ED EKG interpretation:  Rhythm: atrial flutter; and regular . Rate (approx.): 91; Axis: left axis deviation; P wave: normal; QRS interval: normal ; ST/T wave: non-specific changes; Other findings: No acute ischemic changes. This EKG was interpreted by Lori Busch M.D. Records Reviewed: Nursing Notes, Old Medical Records, Previous electrocardiograms, Previous Radiology Studies and Previous Laboratory Studies    Provider Notes (Medical Decision Making): The patient presented with chest pain of uncertain etiology. Based on their history, lab analysis, EKG (which showed no evidence of ischemia or infarction), and imaging, in addition to the patient's physical exam, I see no evidence at this time for a malignant etiology for the patient's chest pain. There is low suspicion and no evidence for pulmonary embolus, acute myocardial infarction, pneumothorax, esophageal rupture, cardiac tamponade, thoracic artery dissection, or any other emergent cardiac, pulmonary or aortic pathology at this time. Given the low pre-test probability for cardiac etiology of chest pain and the absence of any sign of ischemia or infarction, discharge for outpatient follow-up and further evaluation is reasonable. I have explained to the patient that even though a cardiac problem is very unlikely, follow-up and further testing is required to reduce further the already small uncertainty that exists. Other life-threatening diagnoses have been considered.  The patient understands the need to return immediately if their symptoms worsen or they develop any new symptoms, and not to engage in any significant exertional activity until follow-up is obtained. Heart score: 3  Risk of MACE: low      Scores 0-3 Low risk: 0.9-1.7% risk of adverse cardiac event. In the HEART Score study, these patients were discharged. Scores 4-6 Moderate risk: 12-16.6% risk of adverse cardiac event. In the HEART Score study, these patients were admitted to the hospital.   Scores ? 7 High risk: 50-65% risk of adverse cardiac event. In the HEART Score study, these patients were candidates for early invasive measures. A MACE (Major Adverse Cardiac Event) was defined as all-cause mortality, myocardial infarction, or coronary revascularization. ED Course:   Initial assessment performed. The patients presenting problems have been discussed, and they are in agreement with the care plan formulated and outlined with them. I have encouraged them to ask questions as they arise throughout their visit. I reviewed our electronic medical record system for any past medical records that were available that may contribute to the patient's current condition, the nursing notes and vital signs from today's visit. Francoise Gonzalez MD    Medications Administered During ED Course:  Medications   maalox/viscous lidocaine (COV GI COCKTAIL) (40 mL Oral Given 7/3/19 1200)              Progress Note:  Patient has been reassessed and reports feeling better and symptoms have improved after ED treatment. Didier Smith is able to tolerate PO and ambulate per baseline. Annette Salguero's final labs and imaging have been reviewed with him. He has been counseled regarding his diagnosis. He verbally conveys understanding and agreement of the signs, symptoms, diagnosis, treatment and prognosis and additionally agrees to follow up as recommended with Dr. Nayeli Wells MD in 24 - 48 hours.  He also agrees with the care-plan and conveys that all of his questions have been answered. I have also put together some discharge instructions for him that include: 1) educational information regarding their diagnosis, 2) how to care for their diagnosis at home, as well a 3) list of reasons why they would want to return to the ED prior to their follow-up appointment, should their condition change. I have answered all questions to the patient's satisfaction. Strict return precautions given. He both understood and agreed with plan as discussed above. Vital signs stable for discharge. Critical Care Time: none    Disposition: DISCHARGE     The pt is ready for discharge, feels considerably better, has normal vital signs and feels comfortable going home. I think this is reasonable as no findings today suggest a life-threatening condition. The pt's signs, symptoms, diagnosis, and discharge instructions have been discussed in detail and pt has conveyed their understanding. Written discharge instructions provided. The pt is to follow up as recommended or return to ER should their symptoms worsen. Plan has been discussed, all questions answered and pt is in agreement. Deuce Martinez MD      Diagnosis     Clinical Impression:   1. Other forms of angina pectoris ([address]    2. Gastroesophageal reflux disease, esophagitis presence not specified        Attestation:  I personally performed the services described in this documentation on this date 7/3/2019 for patient Kory Poe. Deuce Martinez MD    Please note that this dictation was completed with [employer], the computer voice recognition software. Quite often unanticipated grammatical, syntax, homophones, and other interpretive errors are inadvertently transcribed by the computer software. Please disregard these errors. Please excuse any errors that have escaped final proofreading.

## 2019-07-03 NOTE — PROGRESS NOTES
Pharmacy Clarification of Prior to Admission Medication Regimen     The patient was interviewed regarding clarification of the prior to admission medication regimen and was questioned regarding use of any other inhalers, topical products, over the counter medications, herbal medications, vitamin products or ophthalmic/nasal/otic medication use. Information Obtained From: RX Query, Patient    Pertinent Pharmacy Findings:  Updated patient?s preferred outpatient pharmacy to: [facility] [address] [place] [address], [address]       [facility] medication list was corrected to the following:     Prior to Admission Medications   Prescriptions Last Dose Informant Patient Reported? Taking?   aspirin delayed-release 81 mg tablet 7/3/2019 at Unknown time Self Yes Yes   Sig: Take 81 mg by mouth daily. fluorouracil (EFUDEX) 5 % chemo cream 6/3/2019 at Unknown time Self Yes Yes   Sig: Apply  to affected area two (2) times daily as needed (Itchy Skin/Skin Cancer). metoprolol succinate (TOPROL XL) 25 mg XL tablet 7/2/2019 at Unknown time Self Yes Yes   Sig: Take 50 mg by mouth two (2) times a day. peg 400-hypromellose-glycerin (DRY EYE RELIEF) 1-0.2-0.2 % drop opthalmic solution 7/1/2019 at Unknown time  Yes Yes   Sig: Administer 1 Drop to both eyes three (3) times daily as needed (Dry Eye).       Facility-Administered Medications: None          Thank you,  Lorena Hdz CPhT  Medication History Pharmacy Technician

## 2019-07-03 NOTE — ED NOTES
Pt discharged by Dr Alex Blanco. Pt provided with discharge instructions Rx and instructions on follow up care. Pt out of ED under own power steady gait accompanied by family.

## 2020-01-27 ENCOUNTER — HOSPITAL ENCOUNTER (OUTPATIENT)
Dept: CT IMAGING | Age: 85
Discharge: HOME | End: 2020-01-27
Attending: OTOLARYNGOLOGY
Payer: MEDICARE

## 2020-01-27 DIAGNOSIS — Z78.9 NON-SMOKER: ICD-10-CM

## 2020-01-27 DIAGNOSIS — R22.0 SWELLING, MASS, OR LUMP ON FACE: ICD-10-CM

## 2020-01-27 DIAGNOSIS — Z00.8 OTHER SPECIFIED GENERAL MEDICAL EXAMINATION: ICD-10-CM

## 2020-01-27 LAB — CREAT BLD-MCNC: 1.2 MG/DL (ref 0.6–1.3)

## 2020-01-27 PROCEDURE — 74011636320 HC RX REV CODE- 636/320: Performed by: OTOLARYNGOLOGY

## 2020-01-27 PROCEDURE — 82565 ASSAY OF CREATININE: CPT

## 2020-01-27 PROCEDURE — 70491 CT SOFT TISSUE NECK W/DYE: CPT

## 2020-01-27 RX ORDER — SODIUM CHLORIDE 0.9 % (FLUSH) 0.9 %
10 SYRINGE (ML) INJECTION
Status: COMPLETED | OUTPATIENT
Start: 2020-01-27 | End: 2020-01-27

## 2020-01-27 RX ADMIN — IOPAMIDOL 100 ML: 755 INJECTION, SOLUTION INTRAVENOUS at 08:21

## 2020-01-27 RX ADMIN — Medication 10 ML: at 08:21

## 2020-02-10 ENCOUNTER — HOSPITAL ENCOUNTER (OUTPATIENT)
Dept: ULTRASOUND IMAGING | Age: 85
Discharge: HOME | End: 2020-02-10
Attending: OTOLARYNGOLOGY
Payer: MEDICARE

## 2020-02-10 DIAGNOSIS — Z00.8 OTHER SPECIFIED GENERAL MEDICAL EXAMINATION: ICD-10-CM

## 2020-02-10 DIAGNOSIS — Z78.9 NON-SMOKER: ICD-10-CM

## 2020-02-10 DIAGNOSIS — R22.0 SWELLING OF HEAD: ICD-10-CM

## 2020-02-10 PROCEDURE — 74011000250 HC RX REV CODE- 250: Performed by: RADIOLOGY

## 2020-02-10 PROCEDURE — 88305 TISSUE EXAM BY PATHOLOGIST: CPT

## 2020-02-10 PROCEDURE — 77030003503 US GUIDE BX LYMPH NOD SUPER

## 2020-02-10 RX ORDER — LIDOCAINE HYDROCHLORIDE 10 MG/ML
7 INJECTION INFILTRATION; PERINEURAL
Status: COMPLETED | OUTPATIENT
Start: 2020-02-10 | End: 2020-02-10

## 2020-02-10 RX ADMIN — LIDOCAINE HYDROCHLORIDE 7 ML: 10 INJECTION, SOLUTION INFILTRATION; PERINEURAL at 15:00

## 2022-11-25 ENCOUNTER — HOSPITAL ENCOUNTER (EMERGENCY)
Age: 87
Discharge: HOME | End: 2022-11-25
Attending: EMERGENCY MEDICINE
Payer: MEDICARE

## 2022-11-25 ENCOUNTER — APPOINTMENT (OUTPATIENT)
Dept: CT IMAGING | Age: 87
End: 2022-11-25
Attending: PHYSICIAN ASSISTANT
Payer: MEDICARE

## 2022-11-25 VITALS
DIASTOLIC BLOOD PRESSURE: 75 MMHG | BODY MASS INDEX: 31.25 KG/M2 | SYSTOLIC BLOOD PRESSURE: 143 MMHG | RESPIRATION RATE: 16 BRPM | OXYGEN SATURATION: 98 % | HEART RATE: 98 BPM | WEIGHT: 250 LBS

## 2022-11-25 DIAGNOSIS — M47.816 LUMBAR SPONDYLOSIS: ICD-10-CM

## 2022-11-25 DIAGNOSIS — S39.012A STRAIN OF LUMBAR REGION, INITIAL ENCOUNTER: Primary | ICD-10-CM

## 2022-11-25 DIAGNOSIS — I71.40 ABDOMINAL AORTIC ANEURYSM (AAA) WITHOUT RUPTURE, UNSPECIFIED PART: ICD-10-CM
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
## 2022-11-25 DIAGNOSIS — I72.3 ILIAC ARTERY ANEURYSM, BILATERAL (HCC): ICD-10-CM

## 2022-11-25 PROCEDURE — 74011000250 HC RX REV CODE- 250: Performed by: EMERGENCY MEDICINE

## 2022-11-25 PROCEDURE — 74011250637 HC RX REV CODE- 250/637: Performed by: EMERGENCY MEDICINE

## 2022-11-25 PROCEDURE — 99284 EMERGENCY DEPT VISIT MOD MDM: CPT

## 2022-11-25 PROCEDURE — 72131 CT LUMBAR SPINE W/O DYE: CPT

## 2022-11-25 RX ORDER — LIDOCAINE 4 G/100G
2 PATCH TOPICAL
Status: DISCONTINUED | OUTPATIENT
Start: 2022-11-25 | End: 2022-11-26 | Stop reason: HOSPADM

## 2022-11-25 RX ORDER — ACETAMINOPHEN 500 MG
1000 TABLET ORAL
Status: COMPLETED | OUTPATIENT
Start: 2022-11-25 | End: 2022-11-25

## 2022-11-25 RX ADMIN — ACETAMINOPHEN 1000 MG: 500 TABLET ORAL at 23:15

## 2022-11-26 NOTE — ED PROVIDER NOTES
EMERGENCY DEPARTMENT HISTORY AND PHYSICAL EXAM       Please note that this dictation was completed with [employer], the computer voice recognition software. Quite often unanticipated grammatical, syntax, homophones, and other interpretive errors are inadvertently transcribed by the computer software. Please disregard these errors. Please excuse any errors that have escaped final proofreading. Date: 11/25/2022  Patient Name: Esthela Maciel  Patient Age and Sex: 80 y.o. male    History of Presenting Illness     Chief Complaint   Patient presents with    Fall     Ambulatory into triage, cc of lower back pain radiates in right leg after approx 5 ft fall on Wednesday. Did not strike head, no LOC    Back Pain       History Provided By: Patient     Chief Complaint: back pain      HPI: Esthela Maciel, is a 80 y.o. male who presents to the ED with low back pain since Mechanical fall on Wednesday, slippled off ladder, 3ft, fell onto his right side. Pain is lower back, more on right, non radiating, aching, worse with movement. Moderate severity. Better with advil and pillow he uses for back support when laying down. Went to see his pcp who prescribed him pain medications but could not do imaging so was advised to come in. No head trauma. No cp, sob, abd pain. Able to ambulate without difficulty. No pain in arms or legs. Pt denies any other alleviating or exacerbating factors. No other associated signs or symptoms. There are no other complaints, changes or physical findings at this time.      PCP: Ingrid Garcia MD    Past History   All documented elements of the [address] reviewed and verified by me. -Aron Gaucher, MD    Past Medical History:  Past Medical History:   Diagnosis Date    Arrhythmia     atrial flutter 2015    Cancer [facility])     skin cancer basal cell    Nocturia        Past Surgical History:  Past Surgical History:   Procedure Laterality Date    COLONOSCOPY N/A 9/27/2016    COLONOSCOPY performed by Alexis Blount Whitney Samson MD at [facility] ENDOSCOPY    COLONOSCOPY N/A 10/10/2017    COLONOSCOPY performed by Moose Nicholson MD at [facility] ENDOSCOPY    HX HEENT      rt eye removal of foreign object    HX HEENT      skin cancer removed on head and arms       Family History:   Family History   Problem Relation Age of Onset    Cancer Mother        Social History:  Social History     Tobacco Use    Smoking status: Never    Smokeless tobacco: Former   Vaping Use    Vaping Use: Never used   Substance Use Topics    Alcohol use: No     Alcohol/week: 0.0 standard drinks    Drug use: Never       Current Medications:  No current facility-administered medications on file prior to encounter. Current Outpatient Medications on File Prior to Encounter   Medication Sig Dispense Refill    metoprolol succinate (TOPROL XL) 25 mg XL tablet Take 50 mg by mouth two (2) times a day. fluorouracil (EFUDEX) 5 % chemo cream Apply  to affected area two (2) times daily as needed (Itchy Skin/Skin Cancer). peg 400-hypromellose-glycerin (DRY EYE RELIEF) 1-0.2-0.2 % drop opthalmic solution Administer 1 Drop to both eyes three (3) times daily as needed (Dry Eye). aspirin delayed-release 81 mg tablet Take 81 mg by mouth daily. Allergies:  No Known Allergies    Review of Systems   All other systems reviewed and negative    Review of Systems   Constitutional:  Negative for fever. HENT:  Negative for congestion. Eyes:  Negative for visual disturbance. Respiratory:  Negative for shortness of breath. Cardiovascular:  Negative for chest pain and palpitations. Gastrointestinal:  Negative for abdominal pain, nausea and vomiting. Endocrine: Negative. Genitourinary:  Negative for dysuria, flank pain and hematuria. Musculoskeletal:  Positive for back pain. Negative for arthralgias, gait problem, joint swelling and neck pain. Skin: Negative. Negative for color change, rash and wound.    Neurological:  Negative for dizziness, weakness, light-headedness, numbness and headaches. Hematological:  Negative for adenopathy. Does not bruise/bleed easily. Psychiatric/Behavioral: Negative. Negative for confusion. All other systems reviewed and are negative. Physical Exam   Reviewed patients vital signs and nursing note    Physical Exam  Vitals and nursing note reviewed. Constitutional:       Appearance: He is not ill-appearing or diaphoretic. HENT:      Head: Atraumatic. Nose: Nose normal.      Mouth/Throat:      Mouth: Mucous membranes are moist.   Eyes:      General: No scleral icterus. Extraocular Movements: Extraocular movements intact. Conjunctiva/sclera: Conjunctivae normal.      Pupils: Pupils are equal, round, and reactive to light. Cardiovascular:      Rate and Rhythm: Normal rate and regular rhythm. Pulses: Normal pulses. Heart sounds: Normal heart sounds. Pulmonary:      Effort: Pulmonary effort is normal.      Breath sounds: Normal breath sounds. Chest:      Chest wall: No tenderness. Abdominal:      General: Bowel sounds are normal. There is no distension. Palpations: Abdomen is soft. Tenderness: There is no abdominal tenderness. There is no right CVA tenderness or left CVA tenderness. Musculoskeletal:         General: No swelling or deformity. Normal range of motion. Cervical back: Normal, normal range of motion and neck supple. No rigidity or tenderness. Normal range of motion. Thoracic back: Normal. No tenderness or bony tenderness. Normal range of motion. Lumbar back: Tenderness present. No swelling, deformity or bony tenderness. Normal range of motion. Negative right straight leg raise test and negative left straight leg raise test.        Back:       Right lower leg: No edema. Left lower leg: No edema. Comments: Paraspinal muscle tenderness in lower back. Normal and equal dp pulses   Skin:     General: Skin is warm and dry.       Capillary Refill: Capillary refill takes less than 2 seconds. Findings: No bruising or erythema. Neurological:      General: No focal deficit present. Mental Status: He is alert and oriented to person, place, and time. Sensory: Sensation is intact. Motor: Motor function is intact. No weakness or tremor. Gait: Gait is intact. Deep Tendon Reflexes: Babinski sign absent on the right side. Babinski sign absent on the left side. Reflex Scores:       Patellar reflexes are 2+ on the right side and 2+ on the left side. Achilles reflexes are 2+ on the right side and 2+ on the left side. Psychiatric:         Mood and Affect: Mood normal.         Behavior: Behavior normal.       Diagnostic Study Results     Labs - I have personally reviewed and interpreted all laboratory results. Interpretation of available and pertinent results detailed below in [facility]. Juanita Hernandez MD, MSc  No results found for this or any previous visit (from the past 24 hour(s)). Radiologic Studies - I have personally reviewed and interpreted (see [facility] for brief interpreation of available results) all imaging studies and agree with radiology interpretation and report. - Juanita Hernandez MD, MSc  CT SPINE LUMB WO CONT   Final Result   1. Moderate to severe lower lumbar spondylosis as above without acute fracture   or subluxation. 2.  Incidental 5.2 cm abdominal aortic, 3.9 cm left common iliac, and 2.3 cm   right common iliac aneurysms. Recommend dedicated CT abdomen/pelvis evaluation. Medical Decision Making   I am the first provider for this patient. Records Reviewed:   I reviewed our electronic medical record system for any past medical records that were available that may contribute to the patient's current condition, including their PMH, surgical history, social and family history.   This includes most recent ED visits, any available discharge summaries and prior ECGs, which I have reviewed and interpreted personally. I have summarized most salient findings in my HPI and MDM. I also reviewed the nursing notes and vital signs from today's visit. Nursing notes will be reviewed as they become available in realtime while the pt has been in the ED. Johney Sever, MD, MSc    Vital Signs-Reviewed the patient's vital signs. Patient Vitals for the past 24 hrs:   Pulse Resp BP SpO2   11/25/22 1821 98 16 (!) 143/75 98 %       Provider Notes and Medical Decision Making:     Patient has no significant red flags for low back pain including no history of cancer, corticosteroid use, abnormal neurologic physical findings (including new ataxia and/or difficulty walking), and anticoagulant use. On exam: Straight leg raise negative. Patient ambulating appropriately with a normal gait. No sudden bladder or bowel retention or incontinence. No lower extremity weakness. No saddle numbness, hypoesthesia, or anesthesia. Patellar and Achilles reflexes equal and normal. All of this is reassuring and c/w lumbar musculoskeletal pain/spasm. Unlikely spinal cord compression syndrome, cauda equina, or acute disc herniation with significant cord compression. Unlikely vertebral malignancy/metastasis, fracture, or infection. Unlikely epidural abscess or osteomyelitis. ED Course: The patients presenting problems have been discussed, and they are in agreement with the care plan formulated and outlined with them. I have encouraged them to ask questions as they arise throughout their visit. Reassessment:  Patient feeling well. CT abd pelv showing incidental finding of abdominal aortic and bilateral inguinal aneurysms. BP and rest of vitals are normal  Recommended further workup with IV contrast however, patient does not want to stay because he has a wife at home whom he has to take care of. Will follow up with pmd in the morning.          ED Medications Administered  Medications   lidocaine 4 % patch 2 Patch (has no administration in time range)       ED Orders Placed:  Orders Placed This Encounter    CT SPINE LUMB WO CONT    lidocaine 4 % patch 2 Patch     Progress note:  Patient has been reassessed and reports feeling well, has normal vital signs and feels comfortable going home. He has verbalized back his dx and dangers of leaving without further workup which includes death. He is able to get close follow up, competent to make his medical decisions. encouraged him to come back any time and stressed importance of calling his doctor tomorrow first thing for follow up. He confirms he will do so. DISPOSITION: DISCHARGE  The patient's results have been reviewed with patient and available family and/or caregiver. They verbally convey their understanding and agreement of the patient's signs, symptoms, diagnosis, treatment and prognosis and additionally agree to follow up as recommended in the discharge instructions or to return to the Emergency Department should the patient's condition change prior to their follow-up appointment. The patient and available family and/or caregiver verbally agree with the care plan and all of their questions have been answered. The discharge instructions have also been provided to the them with educational information regarding the patient's diagnosis as well a list of reasons why the patient would want to return to the ER prior to their follow-up appointment should any concerns arise, the patient's condition change or symptoms worsen. Diana Rodriguez MD, Msc    PLAN:  Discharge Medication List as of 2022 11:08 PM        2. Follow-up Information       Follow up With Specialties Details Why Contact Info    Shakir Hooker MD Family Medicine Call in 1 day  [address] [employer] [address]  [phone]            3. Return to ED if worse       Brandi WHITFIELD MD, am the attending of record for this patient encounter. Diagnosis     Final Clinical Impression:   1.  Strain of lumbar region, initial encounter    2. Fall, initial encounter    3. Lumbar spondylosis    4. Abdominal aortic aneurysm (AAA) without rupture, unspecified part    5. Iliac artery aneurysm, bilateral ([facility] [address]        Attestation:  I personally performed the services described in this documentation on this date 11/25/2022 for patient Akanksha Shay.   Rebecca Shultz MD

## 2022-11-26 NOTE — DISCHARGE INSTRUCTIONS
YOUR CT SCAN FROM TODAY SHOWED THAT YOU HAVE AN ABDOMINAL AORTIC ANEURYSM AND BILATERAL ILIAC ANEURYSMS. THESE ARE THE ENLARGEMENTS OF YOUR BLOOD VESSEL THAT WE TALKED ABOUT. THIS IS AN INCIDENTAL FINDING (MEANING DID NOT OCCUR AS A RESULT OF YOUR FALL) BUT CAN BE LIFE THREATENING IF THE BLOOD VESSEL STARTS LEAKING OR RUPTURES.   YOU WILL NEED FOLLOW UP FOR THIS AS SOON AS POSSIBLE. IF YOU ARE NOT ABLE SPEAK WITH YOUR DOCTOR, YOU ARE WELCOME TO COME BACK TO THE ER AT ANY TIME. It was a pleasure taking care of you in our Emergency Department today. We know that when you come to [facility] you are entrusting us with your health, comfort, and safety. Our physicians and nurses honor that trust, and truly appreciate the opportunity to care for you and your loved ones. We also value your feedback. If you receive a survey about your Emergency Department experience today, please fill it out. We care about our patients' feedback, and we listen to what you have to say.   Thank you!       --- Dr. Jerl Schlatter, MD

## 2024-09-20 ENCOUNTER — ANESTHESIA (OUTPATIENT)
Facility: HOSPITAL | Age: 89
End: 2024-09-20
Payer: MEDICARE

## 2024-09-20 ENCOUNTER — APPOINTMENT (OUTPATIENT)
Facility: HOSPITAL | Age: 89
DRG: 219 | End: 2024-09-20
Payer: MEDICARE

## 2024-09-20 ENCOUNTER — HOSPITAL ENCOUNTER (INPATIENT)
Facility: HOSPITAL | Age: 89
LOS: 8 days | DRG: 219 | End: 2024-09-28
Attending: STUDENT IN AN ORGANIZED HEALTH CARE EDUCATION/TRAINING PROGRAM | Admitting: INTERNAL MEDICINE
Payer: MEDICARE

## 2024-09-20 ENCOUNTER — ANESTHESIA EVENT (OUTPATIENT)
Facility: HOSPITAL | Age: 89
End: 2024-09-20
Payer: MEDICARE

## 2024-09-20 DIAGNOSIS — I71.00 DISSECTING ANEURYSM OF AORTA (HCC): ICD-10-CM

## 2024-09-20 DIAGNOSIS — I71.012 DISSECTION OF DESCENDING THORACIC AORTA (HCC): Primary | ICD-10-CM

## 2024-09-20 DIAGNOSIS — J90 PLEURAL EFFUSION: ICD-10-CM

## 2024-09-20 LAB
ALBUMIN SERPL-MCNC: 3.3 G/DL (ref 3.5–5)
ALBUMIN/GLOB SERPL: 1 (ref 1.1–2.2)
ALP SERPL-CCNC: 84 U/L (ref 45–117)
ALT SERPL-CCNC: 18 U/L (ref 12–78)
ANION GAP SERPL CALC-SCNC: 4 MMOL/L (ref 2–12)
AST SERPL-CCNC: 12 U/L (ref 15–37)
BASOPHILS # BLD: 0 K/UL (ref 0–0.1)
BASOPHILS # BLD: 0 K/UL (ref 0–0.1)
BASOPHILS NFR BLD: 0 % (ref 0–1)
BASOPHILS NFR BLD: 0 % (ref 0–1)
BILIRUB SERPL-MCNC: 1 MG/DL (ref 0.2–1)
BUN SERPL-MCNC: 31 MG/DL (ref 6–20)
BUN/CREAT SERPL: 17 (ref 12–20)
CALCIUM SERPL-MCNC: 8.6 MG/DL (ref 8.5–10.1)
CHLORIDE SERPL-SCNC: 102 MMOL/L (ref 97–108)
CO2 SERPL-SCNC: 31 MMOL/L (ref 21–32)
CREAT SERPL-MCNC: 1.8 MG/DL (ref 0.7–1.3)
DIFFERENTIAL METHOD BLD: ABNORMAL
DIFFERENTIAL METHOD BLD: ABNORMAL
EOSINOPHIL # BLD: 0 K/UL (ref 0–0.4)
EOSINOPHIL # BLD: 0 K/UL (ref 0–0.4)
EOSINOPHIL NFR BLD: 0 % (ref 0–7)
EOSINOPHIL NFR BLD: 0 % (ref 0–7)
ERYTHROCYTE [DISTWIDTH] IN BLOOD BY AUTOMATED COUNT: 15.6 % (ref 11.5–14.5)
ERYTHROCYTE [DISTWIDTH] IN BLOOD BY AUTOMATED COUNT: 15.6 % (ref 11.5–14.5)
GLOBULIN SER CALC-MCNC: 3.3 G/DL (ref 2–4)
GLUCOSE BLD STRIP.AUTO-MCNC: 135 MG/DL (ref 65–117)
GLUCOSE SERPL-MCNC: 146 MG/DL (ref 65–100)
HCT VFR BLD AUTO: 31.4 % (ref 36.6–50.3)
HCT VFR BLD AUTO: 37 % (ref 36.6–50.3)
HGB BLD-MCNC: 11.5 G/DL (ref 12.1–17)
HGB BLD-MCNC: 9.7 G/DL (ref 12.1–17)
IMM GRANULOCYTES # BLD AUTO: 0 K/UL (ref 0–0.04)
IMM GRANULOCYTES # BLD AUTO: 0.1 K/UL (ref 0–0.04)
IMM GRANULOCYTES NFR BLD AUTO: 0 % (ref 0–0.5)
IMM GRANULOCYTES NFR BLD AUTO: 1 % (ref 0–0.5)
INR PPP: 1.1 (ref 0.9–1.1)
LYMPHOCYTES # BLD: 1.4 K/UL (ref 0.8–3.5)
LYMPHOCYTES # BLD: 1.4 K/UL (ref 0.8–3.5)
LYMPHOCYTES NFR BLD: 12 % (ref 12–49)
LYMPHOCYTES NFR BLD: 12 % (ref 12–49)
MAGNESIUM SERPL-MCNC: 2.2 MG/DL (ref 1.6–2.4)
MCH RBC QN AUTO: 29.5 PG (ref 26–34)
MCH RBC QN AUTO: 29.5 PG (ref 26–34)
MCHC RBC AUTO-ENTMCNC: 30.9 G/DL (ref 30–36.5)
MCHC RBC AUTO-ENTMCNC: 31.1 G/DL (ref 30–36.5)
MCV RBC AUTO: 94.9 FL (ref 80–99)
MCV RBC AUTO: 95.4 FL (ref 80–99)
MONOCYTES # BLD: 0.9 K/UL (ref 0–1)
MONOCYTES # BLD: 1.2 K/UL (ref 0–1)
MONOCYTES NFR BLD: 11 % (ref 5–13)
MONOCYTES NFR BLD: 8 % (ref 5–13)
NEUTS SEG # BLD: 8.4 K/UL (ref 1.8–8)
NEUTS SEG # BLD: 9.2 K/UL (ref 1.8–8)
NEUTS SEG NFR BLD: 76 % (ref 32–75)
NEUTS SEG NFR BLD: 80 % (ref 32–75)
NRBC # BLD: 0 K/UL (ref 0–0.01)
NRBC # BLD: 0 K/UL (ref 0–0.01)
NRBC BLD-RTO: 0 PER 100 WBC
NRBC BLD-RTO: 0 PER 100 WBC
NT PRO BNP: 1861 PG/ML
PLATELET # BLD AUTO: 131 K/UL (ref 150–400)
PLATELET # BLD AUTO: 159 K/UL (ref 150–400)
PMV BLD AUTO: 10.6 FL (ref 8.9–12.9)
PMV BLD AUTO: 9.7 FL (ref 8.9–12.9)
POTASSIUM SERPL-SCNC: 4.7 MMOL/L (ref 3.5–5.1)
PROT SERPL-MCNC: 6.6 G/DL (ref 6.4–8.2)
PROTHROMBIN TIME: 11.7 SEC (ref 9–11.1)
RBC # BLD AUTO: 3.29 M/UL (ref 4.1–5.7)
RBC # BLD AUTO: 3.9 M/UL (ref 4.1–5.7)
SERVICE CMNT-IMP: ABNORMAL
SODIUM SERPL-SCNC: 137 MMOL/L (ref 136–145)
TROPONIN I SERPL HS-MCNC: 48 NG/L (ref 0–76)
TSH SERPL DL<=0.05 MIU/L-ACNC: 3.19 UIU/ML (ref 0.36–3.74)
WBC # BLD AUTO: 11 K/UL (ref 4.1–11.1)
WBC # BLD AUTO: 11.5 K/UL (ref 4.1–11.1)

## 2024-09-20 PROCEDURE — 85610 PROTHROMBIN TIME: CPT

## 2024-09-20 PROCEDURE — 2580000003 HC RX 258: Performed by: NURSE PRACTITIONER

## 2024-09-20 PROCEDURE — 4A133B1 MONITORING OF ARTERIAL PRESSURE, PERIPHERAL, PERCUTANEOUS APPROACH: ICD-10-PCS | Performed by: NURSE PRACTITIONER

## 2024-09-20 PROCEDURE — 99285 EMERGENCY DEPT VISIT HI MDM: CPT

## 2024-09-20 PROCEDURE — 86901 BLOOD TYPING SEROLOGIC RH(D): CPT

## 2024-09-20 PROCEDURE — 80053 COMPREHEN METABOLIC PANEL: CPT

## 2024-09-20 PROCEDURE — 0042T CT BRAIN PERFUSION: CPT

## 2024-09-20 PROCEDURE — 6360000002 HC RX W HCPCS: Performed by: NURSE PRACTITIONER

## 2024-09-20 PROCEDURE — 70498 CT ANGIOGRAPHY NECK: CPT

## 2024-09-20 PROCEDURE — 6360000002 HC RX W HCPCS: Performed by: STUDENT IN AN ORGANIZED HEALTH CARE EDUCATION/TRAINING PROGRAM

## 2024-09-20 PROCEDURE — 36620 INSERTION CATHETER ARTERY: CPT

## 2024-09-20 PROCEDURE — 71275 CT ANGIOGRAPHY CHEST: CPT

## 2024-09-20 PROCEDURE — 36415 COLL VENOUS BLD VENIPUNCTURE: CPT

## 2024-09-20 PROCEDURE — 96365 THER/PROPH/DIAG IV INF INIT: CPT

## 2024-09-20 PROCEDURE — 5A0935A ASSISTANCE WITH RESPIRATORY VENTILATION, LESS THAN 24 CONSECUTIVE HOURS, HIGH NASAL FLOW/VELOCITY: ICD-10-PCS | Performed by: NURSE PRACTITIONER

## 2024-09-20 PROCEDURE — 71045 X-RAY EXAM CHEST 1 VIEW: CPT

## 2024-09-20 PROCEDURE — 86923 COMPATIBILITY TEST ELECTRIC: CPT

## 2024-09-20 PROCEDURE — 70450 CT HEAD/BRAIN W/O DYE: CPT

## 2024-09-20 PROCEDURE — 74174 CTA ABD&PLVS W/CONTRAST: CPT

## 2024-09-20 PROCEDURE — 85025 COMPLETE CBC W/AUTO DIFF WBC: CPT

## 2024-09-20 PROCEDURE — 83880 ASSAY OF NATRIURETIC PEPTIDE: CPT

## 2024-09-20 PROCEDURE — 6360000004 HC RX CONTRAST MEDICATION: Performed by: RADIOLOGY

## 2024-09-20 PROCEDURE — 4A133J1 MONITORING OF ARTERIAL PULSE, PERIPHERAL, PERCUTANEOUS APPROACH: ICD-10-PCS | Performed by: NURSE PRACTITIONER

## 2024-09-20 PROCEDURE — 2700000000 HC OXYGEN THERAPY PER DAY

## 2024-09-20 PROCEDURE — 4A03X5D MEASUREMENT OF ARTERIAL FLOW, INTRACRANIAL, EXTERNAL APPROACH: ICD-10-PCS | Performed by: INTERNAL MEDICINE

## 2024-09-20 PROCEDURE — 86900 BLOOD TYPING SEROLOGIC ABO: CPT

## 2024-09-20 PROCEDURE — B440ZZ3 ULTRASONOGRAPHY OF ABDOMINAL AORTA, INTRAVASCULAR: ICD-10-PCS | Performed by: SURGERY

## 2024-09-20 PROCEDURE — 84443 ASSAY THYROID STIM HORMONE: CPT

## 2024-09-20 PROCEDURE — 82962 GLUCOSE BLOOD TEST: CPT

## 2024-09-20 PROCEDURE — 86850 RBC ANTIBODY SCREEN: CPT

## 2024-09-20 PROCEDURE — 84484 ASSAY OF TROPONIN QUANT: CPT

## 2024-09-20 PROCEDURE — 2000000000 HC ICU R&B

## 2024-09-20 PROCEDURE — 83735 ASSAY OF MAGNESIUM: CPT

## 2024-09-20 PROCEDURE — 96375 TX/PRO/DX INJ NEW DRUG ADDON: CPT

## 2024-09-20 PROCEDURE — 6360000004 HC RX CONTRAST MEDICATION: Performed by: INTERNAL MEDICINE

## 2024-09-20 RX ORDER — ESMOLOL HYDROCHLORIDE 10 MG/ML
25-300 INJECTION, SOLUTION INTRAVENOUS CONTINUOUS
Status: DISCONTINUED | OUTPATIENT
Start: 2024-09-20 | End: 2024-09-20

## 2024-09-20 RX ORDER — SODIUM CHLORIDE 9 MG/ML
INJECTION, SOLUTION INTRAVENOUS PRN
Status: DISCONTINUED | OUTPATIENT
Start: 2024-09-20 | End: 2024-09-28 | Stop reason: HOSPADM

## 2024-09-20 RX ORDER — SODIUM CHLORIDE 9 MG/ML
INJECTION, SOLUTION INTRAVENOUS CONTINUOUS
Status: DISCONTINUED | OUTPATIENT
Start: 2024-09-21 | End: 2024-09-22

## 2024-09-20 RX ORDER — ONDANSETRON 4 MG/1
4 TABLET, ORALLY DISINTEGRATING ORAL EVERY 8 HOURS PRN
Status: DISCONTINUED | OUTPATIENT
Start: 2024-09-20 | End: 2024-09-28 | Stop reason: HOSPADM

## 2024-09-20 RX ORDER — ACETAMINOPHEN 325 MG/1
650 TABLET ORAL EVERY 6 HOURS PRN
Status: DISCONTINUED | OUTPATIENT
Start: 2024-09-20 | End: 2024-09-28 | Stop reason: HOSPADM

## 2024-09-20 RX ORDER — IOPAMIDOL 755 MG/ML
100 INJECTION, SOLUTION INTRAVASCULAR
Status: COMPLETED | OUTPATIENT
Start: 2024-09-20 | End: 2024-09-20

## 2024-09-20 RX ORDER — FUROSEMIDE 10 MG/ML
40 INJECTION INTRAMUSCULAR; INTRAVENOUS ONCE
Status: COMPLETED | OUTPATIENT
Start: 2024-09-20 | End: 2024-09-20

## 2024-09-20 RX ORDER — SODIUM CHLORIDE 0.9 % (FLUSH) 0.9 %
5-40 SYRINGE (ML) INJECTION PRN
Status: DISCONTINUED | OUTPATIENT
Start: 2024-09-20 | End: 2024-09-28 | Stop reason: HOSPADM

## 2024-09-20 RX ORDER — ONDANSETRON 2 MG/ML
4 INJECTION INTRAMUSCULAR; INTRAVENOUS EVERY 6 HOURS PRN
Status: DISCONTINUED | OUTPATIENT
Start: 2024-09-20 | End: 2024-09-28 | Stop reason: HOSPADM

## 2024-09-20 RX ORDER — ESMOLOL HYDROCHLORIDE 10 MG/ML
25-300 INJECTION, SOLUTION INTRAVENOUS CONTINUOUS
Status: DISCONTINUED | OUTPATIENT
Start: 2024-09-20 | End: 2024-09-21

## 2024-09-20 RX ORDER — SODIUM CHLORIDE 0.9 % (FLUSH) 0.9 %
5-40 SYRINGE (ML) INJECTION EVERY 12 HOURS SCHEDULED
Status: DISCONTINUED | OUTPATIENT
Start: 2024-09-20 | End: 2024-09-28

## 2024-09-20 RX ORDER — ACETAMINOPHEN 650 MG/1
650 SUPPOSITORY RECTAL EVERY 6 HOURS PRN
Status: DISCONTINUED | OUTPATIENT
Start: 2024-09-20 | End: 2024-09-28 | Stop reason: HOSPADM

## 2024-09-20 RX ORDER — SODIUM CHLORIDE 9 MG/ML
50 INJECTION, SOLUTION INTRAVENOUS ONCE
Status: COMPLETED | OUTPATIENT
Start: 2024-09-20 | End: 2024-09-20

## 2024-09-20 RX ORDER — POLYETHYLENE GLYCOL 3350 17 G/17G
17 POWDER, FOR SOLUTION ORAL DAILY PRN
Status: DISCONTINUED | OUTPATIENT
Start: 2024-09-20 | End: 2024-09-25

## 2024-09-20 RX ORDER — 0.9 % SODIUM CHLORIDE 0.9 %
250 INTRAVENOUS SOLUTION INTRAVENOUS ONCE
Status: COMPLETED | OUTPATIENT
Start: 2024-09-21 | End: 2024-09-21

## 2024-09-20 RX ADMIN — FUROSEMIDE 40 MG: 10 INJECTION, SOLUTION INTRAMUSCULAR; INTRAVENOUS at 18:02

## 2024-09-20 RX ADMIN — IOPAMIDOL 100 ML: 755 INJECTION, SOLUTION INTRAVENOUS at 18:28

## 2024-09-20 RX ADMIN — IOPAMIDOL 100 ML: 755 INJECTION, SOLUTION INTRAVENOUS at 22:57

## 2024-09-20 RX ADMIN — SODIUM CHLORIDE 5 MG/HR: 9 INJECTION, SOLUTION INTRAVENOUS at 21:19

## 2024-09-20 RX ADMIN — SODIUM CHLORIDE 250 ML: 9 INJECTION, SOLUTION INTRAVENOUS at 23:57

## 2024-09-20 RX ADMIN — ESMOLOL HYDROCHLORIDE IN SODIUM CHLORIDE 50 MCG/KG/MIN: 10 INJECTION INTRAVENOUS at 19:32

## 2024-09-20 RX ADMIN — SODIUM CHLORIDE 50 ML: 9 INJECTION, SOLUTION INTRAVENOUS at 21:48

## 2024-09-20 RX ADMIN — SODIUM CHLORIDE, PRESERVATIVE FREE 10 ML: 5 INJECTION INTRAVENOUS at 21:19

## 2024-09-20 RX ADMIN — PROTHROMBIN COMPLEX CONCENTRATE (HUMAN) 2000 UNITS: 25.5; 16.5; 24; 22; 22; 26 POWDER, FOR SOLUTION INTRAVENOUS at 21:48

## 2024-09-20 RX ADMIN — IOPAMIDOL 100 ML: 755 INJECTION, SOLUTION INTRAVENOUS at 23:07

## 2024-09-20 RX ADMIN — ESMOLOL HYDROCHLORIDE IN SODIUM CHLORIDE 50 MCG/KG/MIN: 10 INJECTION INTRAVENOUS at 21:25

## 2024-09-20 ASSESSMENT — PAIN - FUNCTIONAL ASSESSMENT: PAIN_FUNCTIONAL_ASSESSMENT: NONE - DENIES PAIN

## 2024-09-20 NOTE — ED PROVIDER NOTES
[facility] EMERGENCY DEPT  EMERGENCY DEPARTMENT ENCOUNTER       Pt Name: Micheal Ohara  MRN: [id number]  Birthdate 1935  Date of evaluation: 9/20/2024  Provider: Bruno Quinonez MD   PCP: Irvin Cronin MD  Note Started: 5:01 PM EDT 9/20/24     CHIEF COMPLAINT       Chief Complaint   Patient presents with    Shortness of Breath     Pt arrives to ED via EMS. EMS reports that pt was having some SOB when walking around the house starting last Monday. Went to PCP on Wednesday and PCP put pt on eliquis.EMS reports pt O2 sats were 81-83% on RA upon arrival. EMS placed pt on CPAP        HISTORY OF PRESENT ILLNESS: 1 or more elements      History From: patient, History limited by: none     Micheal Ohara is a 88 y.o. male presenting with SOB.  He notes this has been going on for about 1.5 weeks, worse when he lays down or walks too much.  Went to his PCP who put him on elliquis, though unsure why, though the SOB got worse.  He denies CP, cough, fever, NV, abdominal pain.         Please See MDM for Additional Details of the HPI/PMH  Nursing Notes were all reviewed and agreed with or any disagreements were addressed in the HPI.     REVIEW OF SYSTEMS        Positives and Pertinent negatives as per HPI.    PAST HISTORY     Past Medical History:  Past Medical History:   Diagnosis Date    Arrhythmia     atrial flutter 2015    Cancer (HCC)     skin cancer basal cell    Nocturia        Past Surgical History:  Past Surgical History:   Procedure Laterality Date    COLONOSCOPY N/A 10/10/2017    COLONOSCOPY performed by Cortez Laguna MD at [facility] ENDOSCOPY    COLONOSCOPY N/A 9/27/2016    COLONOSCOPY performed by Cortez Laguna MD at [facility] ENDOSCOPY    HEENT      skin cancer removed on head and arms    HEENT      rt eye removal of foreign object    US LYMPH NODE BIOPSY  2/10/2020    US LYMPH NODE BIOPSY 2/10/2020 [facility] RAD US       Family History:  Family History   Problem Relation Age of Onset    Cancer Mother        Social  (GLYCOLAX) packet 17 g (has no administration in time range)   acetaminophen (TYLENOL) tablet 650 mg (has no administration in time range)     Or   acetaminophen (TYLENOL) suppository 650 mg (has no administration in time range)   furosemide (LASIX) injection 40 mg (40 mg IntraVENous Given 9/20/24 1802)   iopamidol (ISOVUE-370) 76 % injection 100 mL (100 mLs IntraVENous Given 9/20/24 1828)       Medical Decision Making  88yoM with PMH of TRUDI  presenting with SOB.  He notes this has been going on for about 1.5 weeks, worse when he lays down or walks too much.  Went to his PCP who put him on elliquis, though unsure why, though the SOB got worse.  He denies CP, cough, fever, NV, abdominal pain.  Exam notable for decreased lung sounds throughout, mild tense 1+ BL LE pitting edema.  Abdomen soft, nontender.  Initially hypoxic to 81 per EMS, came over on CPAP but weened down to 4L NC here.  Ddx includes PE, pulmonary edema, CHF, COPD, asthma, valvular insufficiency.  I reviewed his EKG - T wave inversions in anterior lead but no STEMI, no arrythmias except sinus tachycardia.  Will give IV lasix, chest x-ray, basic lab work.  Will continue on nasal cannula with escalation to BiPAP if needed.    Amount and/or Complexity of Data Reviewed  Labs: ordered.  Radiology: ordered.  ECG/medicine tests: ordered.    Risk  Prescription drug management.  Decision regarding hospitalization.          ED Course as of 09/20/24 2058   Fri Sep 20, 2024   1857 Received call from Dr. Willis - concern for aortic aneurysm.  CTA ordered.   [JS]   1930 Discussed case with Dr. Marinelli, CT surgery, medical management, as descending dissection.  Additional message from radiology noting transmural rupture.  Vascular surgery consulted. Discussed with Dr. Savage who will review scans. [JS]   2000 Discussed with Dr. Lomax - no emergent surgery at this time - medical management and possible intervention tomorrow if he gets worse overnight.  Currently on

## 2024-09-20 NOTE — ED NOTES
Bedside and Verbal shift change report given to Mamie (oncoming nurse) by Caren (offgoing nurse). Report included the following information ED SBAR, Adult Overview, MAR, Recent Results, and Neuro Assessment.

## 2024-09-21 ENCOUNTER — APPOINTMENT (OUTPATIENT)
Facility: HOSPITAL | Age: 89
DRG: 219 | End: 2024-09-21
Payer: MEDICARE

## 2024-09-21 LAB
ALBUMIN SERPL-MCNC: 2.6 G/DL (ref 3.5–5)
ALBUMIN SERPL-MCNC: 2.8 G/DL (ref 3.5–5)
ALBUMIN/GLOB SERPL: 1 (ref 1.1–2.2)
ALBUMIN/GLOB SERPL: 1 (ref 1.1–2.2)
ALP SERPL-CCNC: 64 U/L (ref 45–117)
ALP SERPL-CCNC: 65 U/L (ref 45–117)
ALT SERPL-CCNC: 13 U/L (ref 12–78)
ALT SERPL-CCNC: 14 U/L (ref 12–78)
ANION GAP SERPL CALC-SCNC: 3 MMOL/L (ref 2–12)
ANION GAP SERPL CALC-SCNC: 7 MMOL/L (ref 2–12)
ARTERIAL PATENCY WRIST A: ABNORMAL
AST SERPL-CCNC: 8 U/L (ref 15–37)
AST SERPL-CCNC: 8 U/L (ref 15–37)
BASE DEFICIT BLDA-SCNC: 2.5 MMOL/L
BASOPHILS # BLD: 0 K/UL (ref 0–0.1)
BASOPHILS NFR BLD: 0 % (ref 0–1)
BDY SITE: ABNORMAL
BILIRUB DIRECT SERPL-MCNC: 0.4 MG/DL (ref 0–0.2)
BILIRUB SERPL-MCNC: 1 MG/DL (ref 0.2–1)
BILIRUB SERPL-MCNC: 1 MG/DL (ref 0.2–1)
BUN SERPL-MCNC: 34 MG/DL (ref 6–20)
BUN SERPL-MCNC: 39 MG/DL (ref 6–20)
BUN/CREAT SERPL: 20 (ref 12–20)
BUN/CREAT SERPL: 23 (ref 12–20)
CALCIUM SERPL-MCNC: 7.4 MG/DL (ref 8.5–10.1)
CALCIUM SERPL-MCNC: 7.5 MG/DL (ref 8.5–10.1)
CHLORIDE SERPL-SCNC: 103 MMOL/L (ref 97–108)
CHLORIDE SERPL-SCNC: 104 MMOL/L (ref 97–108)
CO2 SERPL-SCNC: 25 MMOL/L (ref 21–32)
CO2 SERPL-SCNC: 31 MMOL/L (ref 21–32)
CREAT SERPL-MCNC: 1.66 MG/DL (ref 0.7–1.3)
CREAT SERPL-MCNC: 1.73 MG/DL (ref 0.7–1.3)
DIFFERENTIAL METHOD BLD: ABNORMAL
EOSINOPHIL # BLD: 0 K/UL (ref 0–0.4)
EOSINOPHIL NFR BLD: 0 % (ref 0–7)
ERYTHROCYTE [DISTWIDTH] IN BLOOD BY AUTOMATED COUNT: 15.6 % (ref 11.5–14.5)
ERYTHROCYTE [DISTWIDTH] IN BLOOD BY AUTOMATED COUNT: 15.7 % (ref 11.5–14.5)
GAS FLOW.O2 SETTING OXYMISER: 16
GLOBULIN SER CALC-MCNC: 2.6 G/DL (ref 2–4)
GLOBULIN SER CALC-MCNC: 2.7 G/DL (ref 2–4)
GLUCOSE BLD STRIP.AUTO-MCNC: 127 MG/DL (ref 65–117)
GLUCOSE BLD STRIP.AUTO-MCNC: 147 MG/DL (ref 65–117)
GLUCOSE SERPL-MCNC: 127 MG/DL (ref 65–100)
GLUCOSE SERPL-MCNC: 137 MG/DL (ref 65–100)
HCO3 BLDA-SCNC: 24 MMOL/L (ref 22–26)
HCT VFR BLD AUTO: 30.7 % (ref 36.6–50.3)
HCT VFR BLD AUTO: 31.7 % (ref 36.6–50.3)
HGB BLD-MCNC: 10.1 G/DL (ref 12.1–17)
HGB BLD-MCNC: 9.7 G/DL (ref 12.1–17)
HISTORY CHECK: NORMAL
IMM GRANULOCYTES # BLD AUTO: 0.1 K/UL (ref 0–0.04)
IMM GRANULOCYTES NFR BLD AUTO: 1 % (ref 0–0.5)
LACTATE SERPL-SCNC: 1.4 MMOL/L (ref 0.4–2)
LYMPHOCYTES # BLD: 1.2 K/UL (ref 0.8–3.5)
LYMPHOCYTES NFR BLD: 11 % (ref 12–49)
MAGNESIUM SERPL-MCNC: 2.1 MG/DL (ref 1.6–2.4)
MCH RBC QN AUTO: 29.4 PG (ref 26–34)
MCH RBC QN AUTO: 29.8 PG (ref 26–34)
MCHC RBC AUTO-ENTMCNC: 31.6 G/DL (ref 30–36.5)
MCHC RBC AUTO-ENTMCNC: 31.9 G/DL (ref 30–36.5)
MCV RBC AUTO: 92.2 FL (ref 80–99)
MCV RBC AUTO: 94.5 FL (ref 80–99)
MONOCYTES # BLD: 1 K/UL (ref 0–1)
MONOCYTES NFR BLD: 8 % (ref 5–13)
NEUTS SEG # BLD: 9.2 K/UL (ref 1.8–8)
NEUTS SEG NFR BLD: 80 % (ref 32–75)
NRBC # BLD: 0 K/UL (ref 0–0.01)
NRBC # BLD: 0 K/UL (ref 0–0.01)
NRBC BLD-RTO: 0 PER 100 WBC
NRBC BLD-RTO: 0 PER 100 WBC
PCO2 BLDA: 48 MMHG (ref 35–45)
PEEP RESPIRATORY: 5
PH BLDA: 7.32 (ref 7.35–7.45)
PHOSPHATE SERPL-MCNC: 4.1 MG/DL (ref 2.6–4.7)
PLATELET # BLD AUTO: 130 K/UL (ref 150–400)
PLATELET # BLD AUTO: 144 K/UL (ref 150–400)
PMV BLD AUTO: 9.7 FL (ref 8.9–12.9)
PMV BLD AUTO: 9.9 FL (ref 8.9–12.9)
PO2 BLDA: 315 MMHG (ref 80–100)
POTASSIUM SERPL-SCNC: 4.7 MMOL/L (ref 3.5–5.1)
POTASSIUM SERPL-SCNC: 5 MMOL/L (ref 3.5–5.1)
PROT SERPL-MCNC: 5.2 G/DL (ref 6.4–8.2)
PROT SERPL-MCNC: 5.5 G/DL (ref 6.4–8.2)
RBC # BLD AUTO: 3.25 M/UL (ref 4.1–5.7)
RBC # BLD AUTO: 3.44 M/UL (ref 4.1–5.7)
SAO2 % BLD: 100 % (ref 92–97)
SAO2% DEVICE SAO2% SENSOR NAME: ABNORMAL
SERVICE CMNT-IMP: ABNORMAL
SERVICE CMNT-IMP: ABNORMAL
SODIUM SERPL-SCNC: 136 MMOL/L (ref 136–145)
SODIUM SERPL-SCNC: 137 MMOL/L (ref 136–145)
SPECIMEN SITE: ABNORMAL
TROPONIN I SERPL HS-MCNC: 52 NG/L (ref 0–76)
VENTILATION MODE VENT: ABNORMAL
VT SETTING VENT: 550
WBC # BLD AUTO: 11.4 K/UL (ref 4.1–11.1)
WBC # BLD AUTO: 11.7 K/UL (ref 4.1–11.1)

## 2024-09-21 PROCEDURE — C1768 GRAFT, VASCULAR: HCPCS | Performed by: SURGERY

## 2024-09-21 PROCEDURE — C1760 CLOSURE DEV, VASC: HCPCS | Performed by: SURGERY

## 2024-09-21 PROCEDURE — 0BH17EZ INSERTION OF ENDOTRACHEAL AIRWAY INTO TRACHEA, VIA NATURAL OR ARTIFICIAL OPENING: ICD-10-PCS | Performed by: INTERNAL MEDICINE

## 2024-09-21 PROCEDURE — 2500000003 HC RX 250 WO HCPCS: Performed by: NURSE ANESTHETIST, CERTIFIED REGISTERED

## 2024-09-21 PROCEDURE — 3700000000 HC ANESTHESIA ATTENDED CARE: Performed by: SURGERY

## 2024-09-21 PROCEDURE — 71045 X-RAY EXAM CHEST 1 VIEW: CPT

## 2024-09-21 PROCEDURE — 2580000003 HC RX 258: Performed by: SURGERY

## 2024-09-21 PROCEDURE — 83605 ASSAY OF LACTIC ACID: CPT

## 2024-09-21 PROCEDURE — 6360000002 HC RX W HCPCS: Performed by: SURGERY

## 2024-09-21 PROCEDURE — 82962 GLUCOSE BLOOD TEST: CPT

## 2024-09-21 PROCEDURE — 6360000002 HC RX W HCPCS: Performed by: NURSE PRACTITIONER

## 2024-09-21 PROCEDURE — 84100 ASSAY OF PHOSPHORUS: CPT

## 2024-09-21 PROCEDURE — 2580000003 HC RX 258: Performed by: NURSE ANESTHETIST, CERTIFIED REGISTERED

## 2024-09-21 PROCEDURE — C1894 INTRO/SHEATH, NON-LASER: HCPCS | Performed by: SURGERY

## 2024-09-21 PROCEDURE — 80076 HEPATIC FUNCTION PANEL: CPT

## 2024-09-21 PROCEDURE — 5A1945Z RESPIRATORY VENTILATION, 24-96 CONSECUTIVE HOURS: ICD-10-PCS | Performed by: INTERNAL MEDICINE

## 2024-09-21 PROCEDURE — 2709999900 HC NON-CHARGEABLE SUPPLY: Performed by: SURGERY

## 2024-09-21 PROCEDURE — 85025 COMPLETE CBC W/AUTO DIFF WBC: CPT

## 2024-09-21 PROCEDURE — C1769 GUIDE WIRE: HCPCS | Performed by: SURGERY

## 2024-09-21 PROCEDURE — 85027 COMPLETE CBC AUTOMATED: CPT

## 2024-09-21 PROCEDURE — 02VW3DZ RESTRICTION OF THORACIC AORTA, DESCENDING WITH INTRALUMINAL DEVICE, PERCUTANEOUS APPROACH: ICD-10-PCS | Performed by: SURGERY

## 2024-09-21 PROCEDURE — 3E033XZ INTRODUCTION OF VASOPRESSOR INTO PERIPHERAL VEIN, PERCUTANEOUS APPROACH: ICD-10-PCS | Performed by: NURSE ANESTHETIST, CERTIFIED REGISTERED

## 2024-09-21 PROCEDURE — 51702 INSERT TEMP BLADDER CATH: CPT

## 2024-09-21 PROCEDURE — 6360000002 HC RX W HCPCS: Performed by: NURSE ANESTHETIST, CERTIFIED REGISTERED

## 2024-09-21 PROCEDURE — 2580000003 HC RX 258

## 2024-09-21 PROCEDURE — 2580000003 HC RX 258: Performed by: STUDENT IN AN ORGANIZED HEALTH CARE EDUCATION/TRAINING PROGRAM

## 2024-09-21 PROCEDURE — 3600000017 HC SURGERY HYBRID ADDL 15MIN: Performed by: SURGERY

## 2024-09-21 PROCEDURE — 83735 ASSAY OF MAGNESIUM: CPT

## 2024-09-21 PROCEDURE — 2580000003 HC RX 258: Performed by: NURSE PRACTITIONER

## 2024-09-21 PROCEDURE — 6360000002 HC RX W HCPCS

## 2024-09-21 PROCEDURE — 94002 VENT MGMT INPAT INIT DAY: CPT

## 2024-09-21 PROCEDURE — 3600000007 HC SURGERY HYBRID BASE: Performed by: SURGERY

## 2024-09-21 PROCEDURE — 6360000004 HC RX CONTRAST MEDICATION: Performed by: SURGERY

## 2024-09-21 PROCEDURE — 2000000000 HC ICU R&B

## 2024-09-21 PROCEDURE — 6370000000 HC RX 637 (ALT 250 FOR IP): Performed by: NURSE PRACTITIONER

## 2024-09-21 PROCEDURE — 3700000001 HC ADD 15 MINUTES (ANESTHESIA): Performed by: SURGERY

## 2024-09-21 PROCEDURE — 80048 BASIC METABOLIC PNL TOTAL CA: CPT

## 2024-09-21 PROCEDURE — 82803 BLOOD GASES ANY COMBINATION: CPT

## 2024-09-21 PROCEDURE — 2500000003 HC RX 250 WO HCPCS: Performed by: NURSE PRACTITIONER

## 2024-09-21 PROCEDURE — 02HV33Z INSERTION OF INFUSION DEVICE INTO SUPERIOR VENA CAVA, PERCUTANEOUS APPROACH: ICD-10-PCS | Performed by: STUDENT IN AN ORGANIZED HEALTH CARE EDUCATION/TRAINING PROGRAM

## 2024-09-21 PROCEDURE — 37799 UNLISTED PX VASCULAR SURGERY: CPT

## 2024-09-21 DEVICE — IMPLANTABLE DEVICE: Type: IMPLANTABLE DEVICE | Site: GROIN | Status: FUNCTIONAL

## 2024-09-21 RX ORDER — IOPAMIDOL 755 MG/ML
INJECTION, SOLUTION INTRAVASCULAR PRN
Status: DISCONTINUED | OUTPATIENT
Start: 2024-09-21 | End: 2024-09-21

## 2024-09-21 RX ORDER — PHENYLEPHRINE HCL IN 0.9% NACL 0.4MG/10ML
SYRINGE (ML) INTRAVENOUS
Status: DISCONTINUED | OUTPATIENT
Start: 2024-09-21 | End: 2024-09-21 | Stop reason: SDUPTHER

## 2024-09-21 RX ORDER — CEFAZOLIN SODIUM 1 G/3ML
INJECTION, POWDER, FOR SOLUTION INTRAMUSCULAR; INTRAVENOUS
Status: DISCONTINUED | OUTPATIENT
Start: 2024-09-21 | End: 2024-09-21 | Stop reason: SDUPTHER

## 2024-09-21 RX ORDER — SUCCINYLCHOLINE CHLORIDE 20 MG/ML
INJECTION INTRAMUSCULAR; INTRAVENOUS
Status: DISCONTINUED | OUTPATIENT
Start: 2024-09-21 | End: 2024-09-21 | Stop reason: SDUPTHER

## 2024-09-21 RX ORDER — FENTANYL CITRATE 50 UG/ML
25 INJECTION, SOLUTION INTRAMUSCULAR; INTRAVENOUS EVERY 4 HOURS PRN
Status: DISCONTINUED | OUTPATIENT
Start: 2024-09-21 | End: 2024-09-24

## 2024-09-21 RX ORDER — CHLORHEXIDINE GLUCONATE ORAL RINSE 1.2 MG/ML
15 SOLUTION DENTAL 2 TIMES DAILY
Status: DISCONTINUED | OUTPATIENT
Start: 2024-09-21 | End: 2024-09-28

## 2024-09-21 RX ORDER — SODIUM CHLORIDE 9 MG/ML
INJECTION, SOLUTION INTRAVENOUS PRN
Status: COMPLETED | OUTPATIENT
Start: 2024-09-21 | End: 2024-09-21

## 2024-09-21 RX ORDER — LIDOCAINE HYDROCHLORIDE 20 MG/ML
INJECTION, SOLUTION EPIDURAL; INFILTRATION; INTRACAUDAL; PERINEURAL
Status: DISCONTINUED | OUTPATIENT
Start: 2024-09-21 | End: 2024-09-21 | Stop reason: SDUPTHER

## 2024-09-21 RX ORDER — 0.9 % SODIUM CHLORIDE 0.9 %
500 INTRAVENOUS SOLUTION INTRAVENOUS ONCE
Status: COMPLETED | OUTPATIENT
Start: 2024-09-21 | End: 2024-09-21

## 2024-09-21 RX ORDER — ROCURONIUM BROMIDE 10 MG/ML
INJECTION, SOLUTION INTRAVENOUS
Status: DISCONTINUED | OUTPATIENT
Start: 2024-09-21 | End: 2024-09-21 | Stop reason: SDUPTHER

## 2024-09-21 RX ORDER — EPINEPHRINE 1 MG/ML
INJECTION, SOLUTION INTRAMUSCULAR; SUBCUTANEOUS
Status: DISCONTINUED | OUTPATIENT
Start: 2024-09-21 | End: 2024-09-21 | Stop reason: SDUPTHER

## 2024-09-21 RX ORDER — DEXAMETHASONE SODIUM PHOSPHATE 4 MG/ML
INJECTION, SOLUTION INTRA-ARTICULAR; INTRALESIONAL; INTRAMUSCULAR; INTRAVENOUS; SOFT TISSUE
Status: DISCONTINUED | OUTPATIENT
Start: 2024-09-21 | End: 2024-09-21 | Stop reason: SDUPTHER

## 2024-09-21 RX ORDER — DEXMEDETOMIDINE HYDROCHLORIDE 4 UG/ML
.1-1.5 INJECTION, SOLUTION INTRAVENOUS CONTINUOUS
Status: DISCONTINUED | OUTPATIENT
Start: 2024-09-21 | End: 2024-09-28

## 2024-09-21 RX ORDER — SODIUM CHLORIDE, SODIUM LACTATE, POTASSIUM CHLORIDE, CALCIUM CHLORIDE 600; 310; 30; 20 MG/100ML; MG/100ML; MG/100ML; MG/100ML
INJECTION, SOLUTION INTRAVENOUS
Status: DISCONTINUED | OUTPATIENT
Start: 2024-09-21 | End: 2024-09-21 | Stop reason: SDUPTHER

## 2024-09-21 RX ORDER — HEPARIN SODIUM 1000 [USP'U]/ML
INJECTION, SOLUTION INTRAVENOUS; SUBCUTANEOUS
Status: DISCONTINUED | OUTPATIENT
Start: 2024-09-21 | End: 2024-09-21 | Stop reason: SDUPTHER

## 2024-09-21 RX ORDER — PROPOFOL 10 MG/ML
5-50 INJECTION, EMULSION INTRAVENOUS CONTINUOUS
Status: DISCONTINUED | OUTPATIENT
Start: 2024-09-21 | End: 2024-09-21

## 2024-09-21 RX ORDER — ONDANSETRON 2 MG/ML
INJECTION INTRAMUSCULAR; INTRAVENOUS
Status: DISCONTINUED | OUTPATIENT
Start: 2024-09-21 | End: 2024-09-21 | Stop reason: SDUPTHER

## 2024-09-21 RX ORDER — FENTANYL CITRATE 50 UG/ML
INJECTION, SOLUTION INTRAMUSCULAR; INTRAVENOUS
Status: DISCONTINUED | OUTPATIENT
Start: 2024-09-21 | End: 2024-09-21 | Stop reason: SDUPTHER

## 2024-09-21 RX ORDER — PROTAMINE SULFATE 10 MG/ML
INJECTION, SOLUTION INTRAVENOUS
Status: DISCONTINUED | OUTPATIENT
Start: 2024-09-21 | End: 2024-09-21 | Stop reason: SDUPTHER

## 2024-09-21 RX ADMIN — DEXMEDETOMIDINE HYDROCHLORIDE 0.4 MCG/KG/HR: 400 INJECTION, SOLUTION INTRAVENOUS at 12:24

## 2024-09-21 RX ADMIN — PHENYLEPHRINE HYDROCHLORIDE 15 MCG/MIN: 10 INJECTION INTRAVENOUS at 21:53

## 2024-09-21 RX ADMIN — EPINEPHRINE 10 MCG: 1 INJECTION INTRAMUSCULAR; INTRAVENOUS; SUBCUTANEOUS at 01:19

## 2024-09-21 RX ADMIN — Medication 200 MCG: at 01:15

## 2024-09-21 RX ADMIN — SODIUM CHLORIDE, POTASSIUM CHLORIDE, SODIUM LACTATE AND CALCIUM CHLORIDE: 600; 310; 30; 20 INJECTION, SOLUTION INTRAVENOUS at 00:57

## 2024-09-21 RX ADMIN — FAMOTIDINE 20 MG: 10 INJECTION, SOLUTION INTRAVENOUS at 09:07

## 2024-09-21 RX ADMIN — SODIUM CHLORIDE: 9 INJECTION, SOLUTION INTRAVENOUS at 05:42

## 2024-09-21 RX ADMIN — EPINEPHRINE 10 MCG: 1 INJECTION INTRAMUSCULAR; INTRAVENOUS; SUBCUTANEOUS at 01:20

## 2024-09-21 RX ADMIN — FENTANYL CITRATE 25 MCG: 50 INJECTION, SOLUTION INTRAMUSCULAR; INTRAVENOUS at 01:52

## 2024-09-21 RX ADMIN — ROCURONIUM BROMIDE 40 MG: 10 INJECTION INTRAVENOUS at 01:14

## 2024-09-21 RX ADMIN — DEXAMETHASONE SODIUM PHOSPHATE 8 MG: 4 INJECTION INTRA-ARTICULAR; INTRALESIONAL; INTRAMUSCULAR; INTRAVENOUS; SOFT TISSUE at 01:44

## 2024-09-21 RX ADMIN — CHLORHEXIDINE GLUCONATE 15 ML: 1.2 RINSE ORAL at 20:09

## 2024-09-21 RX ADMIN — ESMOLOL HYDROCHLORIDE IN SODIUM CHLORIDE 125 MCG/KG/MIN: 10 INJECTION INTRAVENOUS at 01:05

## 2024-09-21 RX ADMIN — CHLORHEXIDINE GLUCONATE 15 ML: 1.2 RINSE ORAL at 09:06

## 2024-09-21 RX ADMIN — Medication 80 MCG: at 02:23

## 2024-09-21 RX ADMIN — Medication 120 MCG: at 02:09

## 2024-09-21 RX ADMIN — PROPOFOL 25 MCG/KG/MIN: 10 INJECTION, EMULSION INTRAVENOUS at 03:40

## 2024-09-21 RX ADMIN — Medication 120 MCG: at 01:13

## 2024-09-21 RX ADMIN — CEFAZOLIN 2 G: 1 INJECTION, POWDER, FOR SOLUTION INTRAMUSCULAR; INTRAVENOUS; PARENTERAL at 01:45

## 2024-09-21 RX ADMIN — Medication 80 MCG: at 01:10

## 2024-09-21 RX ADMIN — SODIUM CHLORIDE, PRESERVATIVE FREE 40 ML: 5 INJECTION INTRAVENOUS at 09:08

## 2024-09-21 RX ADMIN — DEXMEDETOMIDINE HYDROCHLORIDE 0.4 MCG/KG/HR: 400 INJECTION, SOLUTION INTRAVENOUS at 21:52

## 2024-09-21 RX ADMIN — SODIUM CHLORIDE: 9 INJECTION, SOLUTION INTRAVENOUS at 01:20

## 2024-09-21 RX ADMIN — ONDANSETRON 4 MG: 2 INJECTION INTRAMUSCULAR; INTRAVENOUS at 03:25

## 2024-09-21 RX ADMIN — SODIUM CHLORIDE, PRESERVATIVE FREE 10 ML: 5 INJECTION INTRAVENOUS at 20:09

## 2024-09-21 RX ADMIN — FENTANYL CITRATE 25 MCG: 50 INJECTION INTRAMUSCULAR; INTRAVENOUS at 07:47

## 2024-09-21 RX ADMIN — PHENYLEPHRINE HYDROCHLORIDE 20 MCG/MIN: 10 INJECTION INTRAVENOUS at 01:10

## 2024-09-21 RX ADMIN — SUCCINYLCHOLINE CHLORIDE 140 MG: 20 INJECTION, SOLUTION INTRAMUSCULAR; INTRAVENOUS at 01:12

## 2024-09-21 RX ADMIN — ROCURONIUM BROMIDE 10 MG: 10 INJECTION INTRAVENOUS at 02:57

## 2024-09-21 RX ADMIN — SODIUM CHLORIDE 500 ML: 9 INJECTION, SOLUTION INTRAVENOUS at 04:35

## 2024-09-21 RX ADMIN — LIDOCAINE HYDROCHLORIDE 100 MG: 20 INJECTION, SOLUTION EPIDURAL; INFILTRATION; INTRACAUDAL; PERINEURAL at 01:06

## 2024-09-21 RX ADMIN — Medication 200 MCG: at 01:22

## 2024-09-21 RX ADMIN — Medication 80 MCG: at 02:50

## 2024-09-21 RX ADMIN — PROTAMINE SULFATE 15 MG: 10 INJECTION, SOLUTION INTRAVENOUS at 03:25

## 2024-09-21 RX ADMIN — ROCURONIUM BROMIDE 10 MG: 10 INJECTION INTRAVENOUS at 02:21

## 2024-09-21 RX ADMIN — DEXMEDETOMIDINE HYDROCHLORIDE 0.2 MCG/KG/HR: 400 INJECTION, SOLUTION INTRAVENOUS at 04:44

## 2024-09-21 RX ADMIN — HEPARIN SODIUM 7000 UNITS: 1000 INJECTION INTRAVENOUS; SUBCUTANEOUS at 02:09

## 2024-09-21 RX ADMIN — PROPOFOL 50 MG: 10 INJECTION, EMULSION INTRAVENOUS at 01:10

## 2024-09-21 RX ADMIN — Medication 200 MCG: at 01:18

## 2024-09-21 RX ADMIN — SODIUM CHLORIDE, POTASSIUM CHLORIDE, SODIUM LACTATE AND CALCIUM CHLORIDE: 600; 310; 30; 20 INJECTION, SOLUTION INTRAVENOUS at 03:43

## 2024-09-21 RX ADMIN — EPINEPHRINE 10 MCG: 1 INJECTION INTRAMUSCULAR; INTRAVENOUS; SUBCUTANEOUS at 01:18

## 2024-09-21 RX ADMIN — SODIUM CHLORIDE: 9 INJECTION, SOLUTION INTRAVENOUS at 00:34

## 2024-09-21 RX ADMIN — ROCURONIUM BROMIDE 10 MG: 10 INJECTION INTRAVENOUS at 01:11

## 2024-09-21 RX ADMIN — Medication 80 MCG: at 03:01

## 2024-09-21 RX ADMIN — ROCURONIUM BROMIDE 10 MG: 10 INJECTION INTRAVENOUS at 03:18

## 2024-09-21 RX ADMIN — PHENYLEPHRINE HYDROCHLORIDE 80 MCG/MIN: 10 INJECTION INTRAVENOUS at 01:26

## 2024-09-21 RX ADMIN — SODIUM CHLORIDE: 9 INJECTION, SOLUTION INTRAVENOUS at 12:25

## 2024-09-21 ASSESSMENT — PULMONARY FUNCTION TESTS
PIF_VALUE: 28
PIF_VALUE: 31
PIF_VALUE: 29
PIF_VALUE: 33
PIF_VALUE: 25
PIF_VALUE: 38

## 2024-09-21 ASSESSMENT — ENCOUNTER SYMPTOMS: SHORTNESS OF BREATH: 1

## 2024-09-21 NOTE — PROGRESS NOTES
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  Strict Map goal 80-85, currently on Sandoval-Synephrine  No longer on esmolol strict HR control no longer indicated s/p TEVAR  Serial neurovascular checks, does not have a lumbar drain in place    Postop mechanical ventilation  Lung protective ventilation with goal plateau pressure < 30, driving pressure < 15  CXR reviewed-ETT advanced  Leave intubated until repeat CT on   Precedex, PRN Fentanyl    Acute kidney injury  Likely prerenal in setting of low flow state + IV contrast  Continue IV crystalloids as indicated  Leave Schultz in place for now, high risk for ATN d/t IV contrast  Strict I's/O's goal urine output greater than 0.5 cc/KG/hour avoid nephrotoxins    Glycemic control  Glucose checks, SSI  Avoid hypo-/hyperglycemia    Critical Care Time: 40 minutes       Neida Perez [facility]  Critical Care Medicine  [facility]

## 2024-09-21 NOTE — PROGRESS NOTES
ICU Progress Note        Subjective:    - continues to be on the ventilator.     HPI  89 y/o male PMHx of Aflutter and  HTN presented to ED via EMS with progressive shortness of breath on exertion over past 1.5 weeks. Initial SpO2 81-83 % per EMS, placed on CPAP. + Tachycardia (100-110s), B/P 158/80. CXR w/ widening of the mediastinum concerning for aneurysm or dissection.  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  He was recently started on Eliquis 3 days prior by PCP unclear reason possible A-fib/a flutter?  Vascular surgery was consulted initial plan was for medical management with impulse control,d/w vascular surgery will administer PCC due to high risk of further bleeding. While in ED patient had acute neurochanges/AMS necessitating repeat imaging, CTH/CTA H/N-no flow-limiting stenosis, approximately 65% stenosis of the proximal right ICA.  Vascular surgery at bedside evaluated patient due to concern for further decompensation after discussion with patient and family taken emergently to the OR for emergent TEVAR     Vital Signs:    /73   Pulse 74   Temp 98.4 °F (36.9 °C) (Axillary)   Resp 18   Ht 1.905 m (6' 3\")   Wt 104.8 kg (231 lb 0.7 oz)   SpO2 96%   BMI 28.88 kg/m²             Temp (24hrs), Av.8 °F (36.6 °C), Min:96.8 °F (36 °C), Max:98.4 °F (36.9 °C)       Intake/Output:   Last shift:      No intake/output data recorded.  Last 3 shifts:  1901 -  0700  In: 2418.9 [I.V.:1589.9]  Out: 1020 [Urine:670]    Intake/Output Summary (Last 24 hours) at 2024 0835  Last data filed at 2024 0649  Gross per 24 hour   Intake 2418.89 ml   Output 1020 ml   Net 1398.89 ml         Physical Exam:    GEN: Intubated, sedated, exam limited by sedation.   HEENT: anicteric sclerae, pink conj, ETT in place.   NECK: Supple, -LAD, no neck mass, CVC in place with dressing C/D/I  CV: no murmurs noted.   LUNGS: Coarse BS at bases, otherwise no wheezes, rhonchi, rales  ABD:

## 2024-09-21 NOTE — ED NOTES
2215: Pt on call bell. This RN at bedside stating he was ready to go. Pt noted to be altered. Pt unable to give correct year, disoriented to situation and time. Pt's right pupil noted to be slightly larger than left. Attempted to contact Nithin ICU NP. Rapid Response called.     LNW 2200.

## 2024-09-21 NOTE — PROGRESS NOTES
Patient currently admitted to the ICU.  He was found to have a dissection earlier today.  Now he has acute altered mental status with pupillary changes.  On my exam his right pupil is enlarged compared to left.  Nurse reports new AMS.  Will initiate a level 1 stroke alert to facilitate his care as I am concerned that his dissection is extending throught the carotids.  NOT A TNK CANDIDATE with acute dissection.    JOANNA Weaver MD

## 2024-09-21 NOTE — PROGRESS NOTES
0740 - Bedside verbal report received from off going shift.  Re-positioned for comfort.  SBP drops with coughing/agitation. Will titrate Sandoval gtt as needed to maintain MAP 80-85.  0747 - Given Fentanly 25 mcg IV for vent compliance/comfort.     0800 - Assessment complete, see summary for details.  Eyes open to pain, grimaces and tense with movement.  Positioned for comfort, call bell within reach.  Dr. Castro at bedside, updated on condition, no new orders noted. Bilateral PT/DP pulses audible with doppler. Feet remain cold and pale, warm blanket applied. Bilateral wrist restraints intact for patient safety. No acute distress noted.     0910 - Family at bedside, updated on condition, questions answered and re-assurance given.     1200 - Assessment complete, see summary for details. No changes noted    1340 - Dr. Lomax at bedside, updated on condition, plan to keep sedated today maintain MAP >80, orders noted.     1600 - Assessment complete, no changes noted from previous assessment.  Re-positioned for comfort     1900 - Bedside verbal report given to oncoming shift.

## 2024-09-21 NOTE — PROGRESS NOTES
2320 - Arrived to CCU 2522 via stretcher from ER. Transferred to bed and CCU monitor. Bedside handoff report complete. CHG bath complete. PIV x3 in place with Cardene at 5mg and Esmolol at 100mcg infusing. Neida Perez NP and Dr. Lomax at bedside.    2345 - R radial elle placed by Ana JIMENEZ. BP noted to be 70/50s. Cardene off. Orders for 250ml NS bolus and place koch.    0100 - Left via bed to OR.    0355 - Back from OR. Now with RIJ CVC and ETT in place. Infusions going: Propofol at 25mcg and Phenylephrine 50mcg. RT, CRNA, CCU NP present. Bedside handoff received.    0430 - Acute drop in elle BP to 60s/40s requiring rapid titration up on Phenylephrine. Ana NP called back to bedside. Propofol stopped and order for 500ml NS bolus.    0440 - CXR complete.    0455 - CXR reviewed by Ana JIMENEZ. NGT position ok. ETT needs to be advanced by 4cm. RT advanced from 22 to 26cm at [facility].    0700 - Bedside and Verbal shift change report given to Raya RN (oncoming nurse) by Franny RN (offgoing nurse). Report included the following information Nurse Handoff Report, Adult Overview, Surgery Report, Intake/Output, MAR, Recent Results, Cardiac Rhythm afib, and Neuro Assessment.

## 2024-09-21 NOTE — CONSULTS
Vascular Surgery Consult  --full note dictated  --possibly thoracic aortic IMH/aneurysm rupture; unclear how well he is compensating with BP control  --have discussed with the patient and family (daughter)--high risk of CVA/paralysis/MI/death/etc.  --will proceed with emergent TEVAR  +/- L CCA -> SCA bypass/ embolization of SCA  --appreciate everyone's help

## 2024-09-21 NOTE — PROGRESS NOTES
Responded to RRT called overhead to ED 14. Primary nurse reports mental status change, now disoriented to time and situation with reported pupillary changes. Patient admitted for aortic dissection. Dr. Weaver, ER MD at bedside. Patient not a TNK candidate due to acute dissection,but level 1 code stroke alert called. Dr. MAYTE James at bedside as well. ED staff to complete Stroke protocol.

## 2024-09-21 NOTE — H&P
09/20/24 2057    Imaging    No valid procedures specified.       CRITICAL CARE DOCUMENTATION  I had a face to face encounter with the patient, reviewed and interpreted patient data including clinical events, labs, images, vital signs, I/O's, and examined patient.  I have discussed the case and the plan and management of the patient's care with the consulting services, the bedside nurses and the respiratory therapist.      NOTE OF PERSONAL INVOLVEMENT IN CARE   This patient has a high probability of imminent, clinically significant deterioration, which requires the highest level of preparedness to intervene urgently. I participated in the decision-making and personally managed or directed the management of the following life and organ supporting interventions that required my frequent assessment to treat or prevent imminent deterioration.    I personally spent 70 minutes of critical care time.  This is time spent at this critically ill patient's bedside actively involved in patient care as well as the coordination of care.  This does not include any procedural time which has been billed separately.    EDEL Angel - NP   Critical Care Medicine  [facility]

## 2024-09-21 NOTE — PROGRESS NOTES
Vascular Surgery  --consult received and imaging reviewed  --will try medical mgmt for now in hopes of stabilizing aorta/situation as if acute dissection  --[facility]  --will have operative plan in place should it be needed (stent graft repair)

## 2024-09-21 NOTE — BRIEF OP NOTE
Brief Postoperative Note      Patient: Micheal Ohara  YOB: 1935  MRN: [id number]    Date of Procedure: 9/21/2024    PreOp  --Thoracic aortic aneurysm with rupture    Post-Op Diagnosis: Same       Procedure(s):  THORACIC ENDOVASCULAR AORTIC REPAIR  IVUS    Surgeon(s):  Carroll Lomax MD    Assistant:  Surgical Assistant: Vera Millard    Anesthesia: General    Estimated Blood Loss (mL): less than 100     Complications: None    Specimens:   * No specimens in log *    Implants:  Implant Name Type Inv. Item Serial No.  Lot No. LRB No. Used Action   GRAFT STENT STR 40X100 MM 24 FR TOT CVR FREE MICHAEL VALIANT - [id number] Endografts GRAFT STENT STR 40X100 MM 24 FR TOT CVR FREE MICHAEL VALIANT [id number] [employer]WD NA Left 1 Implanted   SYSTEM DEL [id number] PROX FREEFLO STR STNT GRFT VALIANT - [id number] Endografts SYSTEM DEL [id number] PROX FREEFLO STR STNT GRFT VALIANT [id number] [employer] NA Left 1 Implanted         Drains:   Urinary Catheter 09/20/24 Schultz (Active)   $ Urethral catheter insertion $ Not inserted for procedure 09/21/24 0000   Catheter Indications Need for fluid volume management of the critically ill patient in a critical care setting 09/21/24 0000   Site Assessment No urethral drainage 09/21/24 0000   Urine Color Yellow 09/21/24 0000   Urine Appearance Clear 09/21/24 0000   Collection Container Standard 09/21/24 0000   Securement Method Securing device (Describe) 09/21/24 0000   Catheter Care  Perineal wipes 09/21/24 0000   Catheter Best Practices  Drainage tube clipped to bed;Catheter secured to thigh;Tamper seal intact;Bag below bladder;Lack of dependent loop in tubing;Bag not on floor;Drainage bag less than half full 09/21/24 0000   Status Patent;Draining 09/21/24 0000   Output (mL) 220 mL 09/20/24 9366       Findings:  Infection Present At Time Of Surgery (PATOS) (choose all levels that have infection present):  No infection present  Other Findings:  TEVAR from L CCA with 2 stent grafts as abovel L SCA covered (filling on angio)    Electronically signed by Carroll Lomax MD on 9/21/2024 at 3:49 AM

## 2024-09-21 NOTE — CONSENT
Informed Consent for Blood Component Transfusion Note    I have discussed with the patient the rationale for blood component transfusion; its benefits in treating or preventing fatigue, organ damage, or death; and its risk which includes mild transfusion reactions, rare risk of blood borne infection, or more serious but rare reactions. I have discussed the alternatives to transfusion, including the risk and consequences of not receiving transfusion. The patient had an opportunity to ask questions and had agreed to proceed with transfusion of blood components.    Electronically signed by EDEL Angel NP on 9/20/24 at 11:27 PM EDT

## 2024-09-21 NOTE — PROCEDURES
PROCEDURE NOTE  Date: 9/20/2024   Name: Micheal Ohara  YOB: 1935            Arterial Catheter Insertion Procedure Note    Diagnosis: Aortic dissection    Indications: Continuous BP monitoring    Location: 2522    Performed by: Neida HOLLINGSWORTH    Assistants: RN    Procedure Details:  Performed emergent for critically ill patient patient also gave verbal consent    Time-Out Process performed.    Under sterile conditions (hand hygiene prior to donning gloves, gown, mask, sterile drape), the skin above the right radial artery was prepped with chlorhexidine. Local anesthesia was infiltrated into the skin and subcutaneous tissues. The Radial Art-line kit was used. A introducer needle was inserted into the Right Radial Artery. A guide wire was easily inserted through the needle. The 20 G arterial catheter was advanced over the guidewire and the guide wire and needle were both removed. The arterial catheter was connected to the pressure tubing for the system. The catheter was flushed and the tracing was adequate. The catheter was sutured, and a sterile dressing was applied.    US Guidance: Yes    Complications: None    Condition: Critical      Neida HOLLINGSWORTH, [facility] - Critical Care

## 2024-09-21 NOTE — PROGRESS NOTES
Vascular Surgery  --intubated/ sedated  --does move all extremities when awake  --well perfused bilateral lower extremities and left arm  --labs ok  --agree with resting today  --check creatinine in am; possible re-ct tomorrow / Monday   --CXR reviewed  --possible weaning of vent tomorrow if feasible

## 2024-09-21 NOTE — PROGRESS NOTES
Spoke to Dr. Perez with teleneurology - she agreed with concerns that dissection is extending intracranially.  No additional recommendations.    Spoke to Dr. Lomax with vascular surgery - recommends repeat CTA of chest.  He is coming to evaluate the patient since it seems his clinical course is worsening.  BP pretty optimized at 90/60.  HR 87.  Will see if we can increase esmolol gtt.    JOANNA Weaver MD

## 2024-09-22 LAB
ALBUMIN SERPL-MCNC: 2.6 G/DL (ref 3.5–5)
ALBUMIN/GLOB SERPL: 0.9 (ref 1.1–2.2)
ALP SERPL-CCNC: 61 U/L (ref 45–117)
ALT SERPL-CCNC: 10 U/L (ref 12–78)
ANION GAP SERPL CALC-SCNC: 7 MMOL/L (ref 2–12)
AST SERPL-CCNC: 14 U/L (ref 15–37)
BILIRUB DIRECT SERPL-MCNC: 0.3 MG/DL (ref 0–0.2)
BILIRUB SERPL-MCNC: 1 MG/DL (ref 0.2–1)
BUN SERPL-MCNC: 48 MG/DL (ref 6–20)
BUN/CREAT SERPL: 29 (ref 12–20)
CALCIUM SERPL-MCNC: 7.8 MG/DL (ref 8.5–10.1)
CHLORIDE SERPL-SCNC: 109 MMOL/L (ref 97–108)
CO2 SERPL-SCNC: 25 MMOL/L (ref 21–32)
CREAT SERPL-MCNC: 1.66 MG/DL (ref 0.7–1.3)
EKG ATRIAL RATE: 250 BPM
EKG DIAGNOSIS: NORMAL
EKG P AXIS: -2 DEGREES
EKG Q-T INTERVAL: 416 MS
EKG QRS DURATION: 106 MS
EKG QTC CALCULATION (BAZETT): 461 MS
EKG R AXIS: 30 DEGREES
EKG T AXIS: 265 DEGREES
EKG VENTRICULAR RATE: 74 BPM
ERYTHROCYTE [DISTWIDTH] IN BLOOD BY AUTOMATED COUNT: 15.7 % (ref 11.5–14.5)
GLOBULIN SER CALC-MCNC: 2.8 G/DL (ref 2–4)
GLUCOSE BLD STRIP.AUTO-MCNC: 114 MG/DL (ref 65–117)
GLUCOSE BLD STRIP.AUTO-MCNC: 130 MG/DL (ref 65–117)
GLUCOSE BLD STRIP.AUTO-MCNC: 138 MG/DL (ref 65–117)
GLUCOSE BLD STRIP.AUTO-MCNC: 143 MG/DL (ref 65–117)
GLUCOSE SERPL-MCNC: 140 MG/DL (ref 65–100)
HCT VFR BLD AUTO: 29.5 % (ref 36.6–50.3)
HGB BLD-MCNC: 9.9 G/DL (ref 12.1–17)
MAGNESIUM SERPL-MCNC: 2.2 MG/DL (ref 1.6–2.4)
MCH RBC QN AUTO: 29.7 PG (ref 26–34)
MCHC RBC AUTO-ENTMCNC: 33.6 G/DL (ref 30–36.5)
MCV RBC AUTO: 88.6 FL (ref 80–99)
NRBC # BLD: 0 K/UL (ref 0–0.01)
NRBC BLD-RTO: 0 PER 100 WBC
PHOSPHATE SERPL-MCNC: 2.7 MG/DL (ref 2.6–4.7)
PLATELET # BLD AUTO: 117 K/UL (ref 150–400)
PMV BLD AUTO: 9.2 FL (ref 8.9–12.9)
POTASSIUM SERPL-SCNC: 4.2 MMOL/L (ref 3.5–5.1)
PROT SERPL-MCNC: 5.4 G/DL (ref 6.4–8.2)
RBC # BLD AUTO: 3.33 M/UL (ref 4.1–5.7)
SERVICE CMNT-IMP: ABNORMAL
SERVICE CMNT-IMP: NORMAL
SODIUM SERPL-SCNC: 141 MMOL/L (ref 136–145)
WBC # BLD AUTO: 12.8 K/UL (ref 4.1–11.1)

## 2024-09-22 PROCEDURE — 2580000003 HC RX 258: Performed by: INTERNAL MEDICINE

## 2024-09-22 PROCEDURE — 6360000002 HC RX W HCPCS: Performed by: NURSE PRACTITIONER

## 2024-09-22 PROCEDURE — 85027 COMPLETE CBC AUTOMATED: CPT

## 2024-09-22 PROCEDURE — 2580000003 HC RX 258: Performed by: SURGERY

## 2024-09-22 PROCEDURE — 6360000002 HC RX W HCPCS: Performed by: INTERNAL MEDICINE

## 2024-09-22 PROCEDURE — 80076 HEPATIC FUNCTION PANEL: CPT

## 2024-09-22 PROCEDURE — 2500000003 HC RX 250 WO HCPCS: Performed by: NURSE PRACTITIONER

## 2024-09-22 PROCEDURE — 80048 BASIC METABOLIC PNL TOTAL CA: CPT

## 2024-09-22 PROCEDURE — 83735 ASSAY OF MAGNESIUM: CPT

## 2024-09-22 PROCEDURE — 6370000000 HC RX 637 (ALT 250 FOR IP): Performed by: NURSE PRACTITIONER

## 2024-09-22 PROCEDURE — 2580000003 HC RX 258: Performed by: NURSE PRACTITIONER

## 2024-09-22 PROCEDURE — 84100 ASSAY OF PHOSPHORUS: CPT

## 2024-09-22 PROCEDURE — 94003 VENT MGMT INPAT SUBQ DAY: CPT

## 2024-09-22 PROCEDURE — 93005 ELECTROCARDIOGRAM TRACING: CPT | Performed by: SURGERY

## 2024-09-22 PROCEDURE — 36415 COLL VENOUS BLD VENIPUNCTURE: CPT

## 2024-09-22 PROCEDURE — 82962 GLUCOSE BLOOD TEST: CPT

## 2024-09-22 PROCEDURE — 2000000000 HC ICU R&B

## 2024-09-22 PROCEDURE — 6370000000 HC RX 637 (ALT 250 FOR IP): Performed by: INTERNAL MEDICINE

## 2024-09-22 RX ORDER — FENTANYL CITRATE-0.9 % NACL/PF 10 MCG/ML
25-200 PLASTIC BAG, INJECTION (ML) INTRAVENOUS CONTINUOUS
Status: DISCONTINUED | OUTPATIENT
Start: 2024-09-22 | End: 2024-09-23

## 2024-09-22 RX ORDER — SODIUM CHLORIDE, SODIUM LACTATE, POTASSIUM CHLORIDE, CALCIUM CHLORIDE 600; 310; 30; 20 MG/100ML; MG/100ML; MG/100ML; MG/100ML
INJECTION, SOLUTION INTRAVENOUS CONTINUOUS
Status: DISCONTINUED | OUTPATIENT
Start: 2024-09-22 | End: 2024-09-26

## 2024-09-22 RX ORDER — CASTOR OIL AND BALSAM, PERU 788; 87 MG/G; MG/G
OINTMENT TOPICAL 2 TIMES DAILY
Status: DISCONTINUED | OUTPATIENT
Start: 2024-09-22 | End: 2024-09-28

## 2024-09-22 RX ADMIN — CHLORHEXIDINE GLUCONATE 15 ML: 1.2 RINSE ORAL at 09:12

## 2024-09-22 RX ADMIN — DEXMEDETOMIDINE HYDROCHLORIDE 0.5 MCG/KG/HR: 400 INJECTION, SOLUTION INTRAVENOUS at 15:16

## 2024-09-22 RX ADMIN — Medication 1 AMPULE: at 20:54

## 2024-09-22 RX ADMIN — DEXMEDETOMIDINE HYDROCHLORIDE 0.5 MCG/KG/HR: 400 INJECTION, SOLUTION INTRAVENOUS at 22:04

## 2024-09-22 RX ADMIN — SODIUM CHLORIDE, POTASSIUM CHLORIDE, SODIUM LACTATE AND CALCIUM CHLORIDE: 600; 310; 30; 20 INJECTION, SOLUTION INTRAVENOUS at 09:11

## 2024-09-22 RX ADMIN — FAMOTIDINE 20 MG: 10 INJECTION, SOLUTION INTRAVENOUS at 09:12

## 2024-09-22 RX ADMIN — SODIUM CHLORIDE: 9 INJECTION, SOLUTION INTRAVENOUS at 01:33

## 2024-09-22 RX ADMIN — SODIUM CHLORIDE, POTASSIUM CHLORIDE, SODIUM LACTATE AND CALCIUM CHLORIDE: 600; 310; 30; 20 INJECTION, SOLUTION INTRAVENOUS at 22:04

## 2024-09-22 RX ADMIN — Medication: at 20:56

## 2024-09-22 RX ADMIN — Medication 50 MCG/HR: at 09:59

## 2024-09-22 RX ADMIN — FENTANYL CITRATE 25 MCG: 50 INJECTION INTRAMUSCULAR; INTRAVENOUS at 07:37

## 2024-09-22 RX ADMIN — SODIUM CHLORIDE, PRESERVATIVE FREE 40 ML: 5 INJECTION INTRAVENOUS at 09:12

## 2024-09-22 RX ADMIN — DEXMEDETOMIDINE HYDROCHLORIDE 0.4 MCG/KG/HR: 400 INJECTION, SOLUTION INTRAVENOUS at 06:33

## 2024-09-22 RX ADMIN — Medication 1 AMPULE: at 10:02

## 2024-09-22 RX ADMIN — SODIUM CHLORIDE, PRESERVATIVE FREE 10 ML: 5 INJECTION INTRAVENOUS at 20:54

## 2024-09-22 RX ADMIN — CHLORHEXIDINE GLUCONATE 15 ML: 1.2 RINSE ORAL at 20:53

## 2024-09-22 ASSESSMENT — PULMONARY FUNCTION TESTS
PIF_VALUE: 33
PIF_VALUE: 34
PIF_VALUE: 35
PIF_VALUE: 37

## 2024-09-22 NOTE — PROGRESS NOTES
1900 - Bedside and Verbal shift change report given to Franny RN (oncoming nurse) by Raya RN (offgoing nurse). Report included the following information Nurse Handoff Report, Adult Overview, Intake/Output, MAR, Recent Results, Cardiac Rhythm afib, and Neuro Assessment.     1945 - Shift assessment complete, see flowsheet.    2055 - Neida Perez NP rounding on pt, chart reviewed. No acute needs at this time.    0000 - Reassessment complete, see flowsheet.    0200 - CHG bath complete. EKG arm leads noted to be under axilla, moved to upper chest. Noted T wave inversions in lead 2 and 3. Ana NP notified. Order to obtain EKG.    0245 - EKG reviewed by NP. Continue to monitor and send AM labs now.    0400 - Reassessment complete, see flowsheet.    0615 - Noted alternating rhythm between normal t wave and inverted t wave, remains in afib. Ana NP aware. Strip saved.    0700 - Bedside and Verbal shift change report given to Raya RN (oncoming nurse) by Franny RN (offgoing nurse). Report included the following information Nurse Handoff Report, Adult Overview, Intake/Output, MAR, Recent Results, Cardiac Rhythm afib PVCs, and Neuro Assessment.

## 2024-09-22 NOTE — PROGRESS NOTES
Vascular Surgery  --no new issues  --keeping MAPs around 80  --left arm remains well perfused  --groins ok  --I was concerned about the renal function and other issues but this seems to be stable  --will order CTA chest/abd/pelvis for tomorrow

## 2024-09-22 NOTE — PROGRESS NOTES
ICU Progress Note        Subjective:    - continues to be on the ventilator.    - on the ventilator. Vascular planning on repeating the CT today.     HPI  87 y/o male PMHx of Aflutter and  HTN presented to ED via EMS with progressive shortness of breath on exertion over past 1.5 weeks. Initial SpO2 81-83 % per EMS, placed on CPAP. + Tachycardia (100-110s), B/P 158/80. CXR w/ widening of the mediastinum concerning for aneurysm or dissection.  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  He was recently started on Eliquis 3 days prior by PCP unclear reason possible A-fib/a flutter?  Vascular surgery was consulted initial plan was for medical management with impulse control,d/w vascular surgery will administer PCC due to high risk of further bleeding. While in ED patient had acute neurochanges/AMS necessitating repeat imaging, CTH/CTA H/N-no flow-limiting stenosis, approximately 65% stenosis of the proximal right ICA.  Vascular surgery at bedside evaluated patient due to concern for further decompensation after discussion with patient and family taken emergently to the OR for emergent TEVAR     Vital Signs:    /66   Pulse 73   Temp 99 °F (37.2 °C) (Axillary)   Resp 18   Ht 1.905 m (6' 3\")   Wt 107.4 kg (236 lb 12.4 oz)   SpO2 94%   BMI 29.59 kg/m²             Temp (24hrs), Av.2 °F (37.3 °C), Min:99 °F (37.2 °C), Max:99.9 °F (37.7 °C)       Intake/Output:   Last shift:      701 - 1900  In: -   Out: 120 [Urine:120]  Last 3 shifts: 1901 -  07  In: 4731.5 [I.V.:3902.6]  Out: 2245 [Urine:1795]    Intake/Output Summary (Last 24 hours) at 2024 0836  Last data filed at 2024 0800  Gross per 24 hour   Intake 2096.02 ml   Output 1270 ml   Net 826.02 ml         Physical Exam:    GEN: Intubated, sedated, exam limited by sedation.   HEENT: anicteric sclerae, pink conj, ETT in place.   NECK: Supple, -LAD, no neck mass, CVC in place with dressing C/D/I  CV:

## 2024-09-22 NOTE — PROGRESS NOTES
0730 - Bedside verbal report received from off going shift. Bilateral wrist restraints intact for patient safety.     0737 - Coughing/high peak pressures with movement, given Fentanyl 25 mcg IV.  Re-positioned for comfort, call bell within reach.     0800 - Assessment complete, see summary for details.  Eyes open to voice, follows simple commands and nods head appropriately.  Bilateral PT/DP pulses audible with doppler.  Suctioned for small/moderate amount thick tan secretions.  Sa02 88-92%, vent settings verified.  Right radial a-line with brisk waveform, will titrate Sandoval gtt as needed to maintain MAP >80 (range 80-95)  Dr. Castro at bedside, updated on condition, no new orders noted.     0835 - Daughter at bedside, updated on condition, questions answered and re-assurance given.  Spoke with Dr. Castro re: plan of care.     0924 - Increased restlessness/agitation, nods head yes to C/O pain, increased Precedex gtt to 0.5 mcg.  Dr. Castro updated on condition and aware of patient C/O discomfort, orders noted.     0959 - Initiated Fentanyl gtt per protocol.     1200 - Assessment complete, no changes noted from previous assessment.      1600 - Re-assessment complete, no changes noted form previous assessment.  Repositioned for comfort, call bell within reach.     1900 - Bedside verbal report given to oncoming shift.

## 2024-09-22 NOTE — PLAN OF CARE
measures have been tried or would not be effective before applying the restraint   Evaluate the patient's condition at the time of restraint application   Inform patient/family regarding the reason for restraint   Every 2 hours: Monitor safety, psychosocial status, comfort, nutrition and hydration     Problem: Neurosensory - Adult  Goal: Achieves stable or improved neurological status  Outcome: Progressing  Goal: Achieves maximal functionality and self care  Outcome: Progressing     Problem: Respiratory - Adult  Goal: Achieves optimal ventilation and oxygenation  9/22/2024 0311 by Franny Cm RN  Outcome: Progressing  9/22/2024 0015 by Selvin Marmolejo RCP  Outcome: Progressing     Problem: Cardiovascular - Adult  Goal: Maintains optimal cardiac output and hemodynamic stability  Outcome: Progressing  Goal: Absence of cardiac dysrhythmias or at baseline  Outcome: Progressing     Problem: Skin/Tissue Integrity - Adult  Goal: Skin integrity remains intact  Outcome: Progressing  Goal: Incisions, wounds, or drain sites healing without S/S of infection  Outcome: Progressing  Goal: Oral mucous membranes remain intact  Outcome: Progressing     Problem: Genitourinary - Adult  Goal: Absence of urinary retention  Outcome: Progressing  Goal: Urinary catheter remains patent  Outcome: Progressing     Problem: Infection - Adult  Goal: Absence of infection at discharge  Outcome: Progressing  Goal: Absence of infection during hospitalization  Outcome: Progressing  Goal: Absence of fever/infection during anticipated neutropenic period  Outcome: Progressing     Problem: Metabolic/Fluid and Electrolytes - Adult  Goal: Electrolytes maintained within normal limits  Outcome: Progressing  Goal: Hemodynamic stability and optimal renal function maintained  Outcome: Progressing  Goal: Glucose maintained within prescribed range  Outcome: Progressing     Problem: Hematologic - Adult  Goal: Maintains hematologic stability  Outcome:  Progressing     Problem: Pain  Goal: Verbalizes/displays adequate comfort level or baseline comfort level  Outcome: Progressing  Flowsheets (Taken 9/21/2024 1600 by Raya Louie RN)  Verbalizes/displays adequate comfort level or baseline comfort level:   Assess pain using appropriate pain scale   Administer analgesics based on type and severity of pain and evaluate response   Implement non-pharmacological measures as appropriate and evaluate response

## 2024-09-23 ENCOUNTER — APPOINTMENT (OUTPATIENT)
Facility: HOSPITAL | Age: 89
DRG: 219 | End: 2024-09-23
Payer: MEDICARE

## 2024-09-23 LAB
ALBUMIN SERPL-MCNC: 2.4 G/DL (ref 3.5–5)
ALBUMIN/GLOB SERPL: 0.8 (ref 1.1–2.2)
ALP SERPL-CCNC: 63 U/L (ref 45–117)
ALT SERPL-CCNC: 10 U/L (ref 12–78)
ANION GAP SERPL CALC-SCNC: 6 MMOL/L (ref 2–12)
ANION GAP SERPL CALC-SCNC: 6 MMOL/L (ref 2–12)
AST SERPL-CCNC: 14 U/L (ref 15–37)
BILIRUB DIRECT SERPL-MCNC: 0.3 MG/DL (ref 0–0.2)
BILIRUB SERPL-MCNC: 1 MG/DL (ref 0.2–1)
BUN SERPL-MCNC: 36 MG/DL (ref 6–20)
BUN SERPL-MCNC: 42 MG/DL (ref 6–20)
BUN/CREAT SERPL: 30 (ref 12–20)
BUN/CREAT SERPL: 34 (ref 12–20)
CALCIUM SERPL-MCNC: 7.9 MG/DL (ref 8.5–10.1)
CALCIUM SERPL-MCNC: 7.9 MG/DL (ref 8.5–10.1)
CHLORIDE SERPL-SCNC: 112 MMOL/L (ref 97–108)
CHLORIDE SERPL-SCNC: 114 MMOL/L (ref 97–108)
CO2 SERPL-SCNC: 23 MMOL/L (ref 21–32)
CO2 SERPL-SCNC: 24 MMOL/L (ref 21–32)
CREAT SERPL-MCNC: 1.2 MG/DL (ref 0.7–1.3)
CREAT SERPL-MCNC: 1.25 MG/DL (ref 0.7–1.3)
ERYTHROCYTE [DISTWIDTH] IN BLOOD BY AUTOMATED COUNT: 16.1 % (ref 11.5–14.5)
ERYTHROCYTE [DISTWIDTH] IN BLOOD BY AUTOMATED COUNT: 16.3 % (ref 11.5–14.5)
GLOBULIN SER CALC-MCNC: 2.9 G/DL (ref 2–4)
GLUCOSE BLD STRIP.AUTO-MCNC: 102 MG/DL (ref 65–117)
GLUCOSE BLD STRIP.AUTO-MCNC: 122 MG/DL (ref 65–117)
GLUCOSE BLD STRIP.AUTO-MCNC: 135 MG/DL (ref 65–117)
GLUCOSE BLD STRIP.AUTO-MCNC: 153 MG/DL (ref 65–117)
GLUCOSE SERPL-MCNC: 121 MG/DL (ref 65–100)
GLUCOSE SERPL-MCNC: 126 MG/DL (ref 65–100)
HCT VFR BLD AUTO: 29.4 % (ref 36.6–50.3)
HCT VFR BLD AUTO: 30.1 % (ref 36.6–50.3)
HGB BLD-MCNC: 10 G/DL (ref 12.1–17)
HGB BLD-MCNC: 9.9 G/DL (ref 12.1–17)
MAGNESIUM SERPL-MCNC: 2.4 MG/DL (ref 1.6–2.4)
MCH RBC QN AUTO: 29.7 PG (ref 26–34)
MCH RBC QN AUTO: 30.4 PG (ref 26–34)
MCHC RBC AUTO-ENTMCNC: 33.2 G/DL (ref 30–36.5)
MCHC RBC AUTO-ENTMCNC: 33.7 G/DL (ref 30–36.5)
MCV RBC AUTO: 89.3 FL (ref 80–99)
MCV RBC AUTO: 90.2 FL (ref 80–99)
NRBC # BLD: 0 K/UL (ref 0–0.01)
NRBC # BLD: 0 K/UL (ref 0–0.01)
NRBC BLD-RTO: 0 PER 100 WBC
NRBC BLD-RTO: 0 PER 100 WBC
PHOSPHATE SERPL-MCNC: 1.9 MG/DL (ref 2.6–4.7)
PLATELET # BLD AUTO: 109 K/UL (ref 150–400)
PLATELET # BLD AUTO: 115 K/UL (ref 150–400)
PMV BLD AUTO: 10.3 FL (ref 8.9–12.9)
PMV BLD AUTO: 10.6 FL (ref 8.9–12.9)
POTASSIUM SERPL-SCNC: 4 MMOL/L (ref 3.5–5.1)
POTASSIUM SERPL-SCNC: 4.1 MMOL/L (ref 3.5–5.1)
PROT SERPL-MCNC: 5.3 G/DL (ref 6.4–8.2)
RBC # BLD AUTO: 3.26 M/UL (ref 4.1–5.7)
RBC # BLD AUTO: 3.37 M/UL (ref 4.1–5.7)
SERVICE CMNT-IMP: ABNORMAL
SERVICE CMNT-IMP: NORMAL
SODIUM SERPL-SCNC: 141 MMOL/L (ref 136–145)
SODIUM SERPL-SCNC: 144 MMOL/L (ref 136–145)
WBC # BLD AUTO: 11.5 K/UL (ref 4.1–11.1)
WBC # BLD AUTO: 12.3 K/UL (ref 4.1–11.1)

## 2024-09-23 PROCEDURE — 6370000000 HC RX 637 (ALT 250 FOR IP): Performed by: NURSE PRACTITIONER

## 2024-09-23 PROCEDURE — 6370000000 HC RX 637 (ALT 250 FOR IP): Performed by: INTERNAL MEDICINE

## 2024-09-23 PROCEDURE — 82962 GLUCOSE BLOOD TEST: CPT

## 2024-09-23 PROCEDURE — 85027 COMPLETE CBC AUTOMATED: CPT

## 2024-09-23 PROCEDURE — 84100 ASSAY OF PHOSPHORUS: CPT

## 2024-09-23 PROCEDURE — 71275 CT ANGIOGRAPHY CHEST: CPT

## 2024-09-23 PROCEDURE — 2580000003 HC RX 258: Performed by: INTERNAL MEDICINE

## 2024-09-23 PROCEDURE — 2000000000 HC ICU R&B

## 2024-09-23 PROCEDURE — 32551 INSERTION OF CHEST TUBE: CPT

## 2024-09-23 PROCEDURE — 36415 COLL VENOUS BLD VENIPUNCTURE: CPT

## 2024-09-23 PROCEDURE — 80048 BASIC METABOLIC PNL TOTAL CA: CPT

## 2024-09-23 PROCEDURE — 2500000003 HC RX 250 WO HCPCS: Performed by: NURSE PRACTITIONER

## 2024-09-23 PROCEDURE — 2580000003 HC RX 258: Performed by: NURSE PRACTITIONER

## 2024-09-23 PROCEDURE — 0W9B00Z DRAINAGE OF LEFT PLEURAL CAVITY WITH DRAINAGE DEVICE, OPEN APPROACH: ICD-10-PCS | Performed by: INTERNAL MEDICINE

## 2024-09-23 PROCEDURE — 71045 X-RAY EXAM CHEST 1 VIEW: CPT

## 2024-09-23 PROCEDURE — 6360000002 HC RX W HCPCS: Performed by: INTERNAL MEDICINE

## 2024-09-23 PROCEDURE — 83735 ASSAY OF MAGNESIUM: CPT

## 2024-09-23 PROCEDURE — 6360000004 HC RX CONTRAST MEDICATION: Performed by: SURGERY

## 2024-09-23 PROCEDURE — 2580000003 HC RX 258: Performed by: SURGERY

## 2024-09-23 PROCEDURE — 94003 VENT MGMT INPAT SUBQ DAY: CPT

## 2024-09-23 PROCEDURE — 80076 HEPATIC FUNCTION PANEL: CPT

## 2024-09-23 RX ORDER — IOPAMIDOL 755 MG/ML
100 INJECTION, SOLUTION INTRAVASCULAR
Status: COMPLETED | OUTPATIENT
Start: 2024-09-23 | End: 2024-09-23

## 2024-09-23 RX ORDER — GUAIFENESIN 200 MG/10ML
400 LIQUID ORAL EVERY 6 HOURS
Status: DISCONTINUED | OUTPATIENT
Start: 2024-09-23 | End: 2024-09-26

## 2024-09-23 RX ORDER — SODIUM CHLORIDE, SODIUM LACTATE, POTASSIUM CHLORIDE, AND CALCIUM CHLORIDE .6; .31; .03; .02 G/100ML; G/100ML; G/100ML; G/100ML
500 INJECTION, SOLUTION INTRAVENOUS ONCE
Status: COMPLETED | OUTPATIENT
Start: 2024-09-23 | End: 2024-09-23

## 2024-09-23 RX ORDER — FENTANYL CITRATE 50 UG/ML
25 INJECTION, SOLUTION INTRAMUSCULAR; INTRAVENOUS EVERY 4 HOURS PRN
Status: DISCONTINUED | OUTPATIENT
Start: 2024-09-23 | End: 2024-09-26

## 2024-09-23 RX ADMIN — GUAIFENESIN 400 MG: 200 SOLUTION ORAL at 23:33

## 2024-09-23 RX ADMIN — Medication: at 11:07

## 2024-09-23 RX ADMIN — GUAIFENESIN 400 MG: 200 SOLUTION ORAL at 11:07

## 2024-09-23 RX ADMIN — DEXMEDETOMIDINE HYDROCHLORIDE 0.2 MCG/KG/HR: 400 INJECTION, SOLUTION INTRAVENOUS at 20:30

## 2024-09-23 RX ADMIN — DIBASIC SODIUM PHOSPHATE, MONOBASIC POTASSIUM PHOSPHATE AND MONOBASIC SODIUM PHOSPHATE 2 TABLET: 852; 155; 130 TABLET ORAL at 20:32

## 2024-09-23 RX ADMIN — Medication 1 AMPULE: at 11:08

## 2024-09-23 RX ADMIN — SODIUM CHLORIDE, POTASSIUM CHLORIDE, SODIUM LACTATE AND CALCIUM CHLORIDE 500 ML: 600; 310; 30; 20 INJECTION, SOLUTION INTRAVENOUS at 22:18

## 2024-09-23 RX ADMIN — IOPAMIDOL 100 ML: 755 INJECTION, SOLUTION INTRAVENOUS at 10:40

## 2024-09-23 RX ADMIN — GUAIFENESIN 400 MG: 200 SOLUTION ORAL at 18:33

## 2024-09-23 RX ADMIN — CHLORHEXIDINE GLUCONATE 15 ML: 1.2 RINSE ORAL at 20:32

## 2024-09-23 RX ADMIN — SODIUM CHLORIDE, PRESERVATIVE FREE 10 ML: 5 INJECTION INTRAVENOUS at 20:33

## 2024-09-23 RX ADMIN — SODIUM CHLORIDE, PRESERVATIVE FREE 10 ML: 5 INJECTION INTRAVENOUS at 09:00

## 2024-09-23 RX ADMIN — GUAIFENESIN 400 MG: 200 SOLUTION ORAL at 06:07

## 2024-09-23 RX ADMIN — Medication 1 AMPULE: at 20:32

## 2024-09-23 RX ADMIN — DIBASIC SODIUM PHOSPHATE, MONOBASIC POTASSIUM PHOSPHATE AND MONOBASIC SODIUM PHOSPHATE 2 TABLET: 852; 155; 130 TABLET ORAL at 06:07

## 2024-09-23 RX ADMIN — CHLORHEXIDINE GLUCONATE 15 ML: 1.2 RINSE ORAL at 11:07

## 2024-09-23 RX ADMIN — DEXMEDETOMIDINE HYDROCHLORIDE 0.4 MCG/KG/HR: 400 INJECTION, SOLUTION INTRAVENOUS at 06:13

## 2024-09-23 RX ADMIN — SODIUM CHLORIDE, POTASSIUM CHLORIDE, SODIUM LACTATE AND CALCIUM CHLORIDE: 600; 310; 30; 20 INJECTION, SOLUTION INTRAVENOUS at 12:05

## 2024-09-23 RX ADMIN — Medication: at 20:32

## 2024-09-23 RX ADMIN — FAMOTIDINE 20 MG: 10 INJECTION, SOLUTION INTRAVENOUS at 11:07

## 2024-09-23 RX ADMIN — Medication 50 MCG/HR: at 00:34

## 2024-09-23 ASSESSMENT — PULMONARY FUNCTION TESTS
PIF_VALUE: 36
PIF_VALUE: 33
PIF_VALUE: 32
PIF_VALUE: 32

## 2024-09-23 NOTE — PROGRESS NOTES
ICU Progress Note        Subjective:    - continues to be on the ventilator.    - on the ventilator. Vascular planning on repeating the CT today.    - on the vent. Had CTA earlier, shows massive pleural effusion LEFT > right with atelectasis.     HPI  89 y/o male PMHx of Aflutter and  HTN presented to ED via EMS with progressive shortness of breath on exertion over past 1.5 weeks. Initial SpO2 81-83 % per EMS, placed on CPAP. + Tachycardia (100-110s), B/P 158/80. CXR w/ widening of the mediastinum concerning for aneurysm or dissection.  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  He was recently started on Eliquis 3 days prior by PCP unclear reason possible A-fib/a flutter?  Vascular surgery was consulted initial plan was for medical management with impulse control,d/w vascular surgery will administer PCC due to high risk of further bleeding. While in ED patient had acute neurochanges/AMS necessitating repeat imaging, CTH/CTA H/N-no flow-limiting stenosis, approximately 65% stenosis of the proximal right ICA.  Vascular surgery at bedside evaluated patient due to concern for further decompensation after discussion with patient and family taken emergently to the OR for emergent TEVAR     Vital Signs:    /83   Pulse 80   Temp 97.4 °F (36.3 °C) (Axillary)   Resp 18   Ht 1.905 m (6' 3\")   Wt 110.4 kg (243 lb 6.2 oz)   SpO2 96%   BMI 30.42 kg/m²             Temp (24hrs), Av.5 °F (36.9 °C), Min:97.4 °F (36.3 °C), Max:99.5 °F (37.5 °C)       Intake/Output:   Last shift:       07 - 1900  In: 18.8 [I.V.:18.8]  Out: 435 [Urine:435]  Last 3 shifts: 1901 -  0700  In: 3506 [I.V.:3386]  Out: 2265 [Urine:2040]    Intake/Output Summary (Last 24 hours) at 2024 1320  Last data filed at 2024 1300  Gross per 24 hour   Intake 1870.59 ml   Output 1750 ml   Net 120.59 ml         Physical Exam:    GEN: Intubated, sedated, exam limited by sedation.   HEENT:  Range    POC Glucose 122 (H) 65 - 117 mg/dL    Performed by: Toi Blanton (AJCQUELIN RN)    POCT Glucose    Collection Time: 09/23/24 12:11 PM   Result Value Ref Range    POC Glucose 135 (H) 65 - 117 mg/dL    Performed by: Good Kay LPN        Imaging:    CTA CHEST ABDOMEN PELVIS W WO CONTRAST   Final Result   1. Expected postoperative findings of descending thoracic aortic endograft   repair. Indeterminant endoleak near the proximal part of the endograft. The left   common carotid and left subclavian arteries are jailed, however there is intact   flow in each of these.   2. Mediastinal hemorrhage is similar to prior examination. No active bleeding,   no pseudoaneurysm.   3. Stable 6.6 cm infrarenal abdominal aortic aneurysm. Stable left common iliac   artery aneurysm.   4. There is no opacification in the bilateral external iliac arteries, however   this probably relates to contrast bolus timing.   5. Large bilateral pleural effusions, with layering density on the left   compatible with hemothorax.   6. Irregular hepatic surface contour suggestive of cirrhosis.   7. Cholelithiasis..               Electronically signed by SB MONTOYA      XR CHEST PORTABLE   Final Result      1. The ET tube tip is high riding. It can be advanced 2-3 cm for improved   positioning.      2. Stable widening of the mediastinum status post graft repair of the aortic   arch. Small left effusion.            Electronically signed by BROCK CORTEZ      CTA CHEST ABDOMEN PELVIS W WO CONTRAST   Final Result   Vascular:   1. Stable appearance of ruptured thoracic aortic aneurysm in the proximal   descending thoracic aorta with associated intramural hematoma and mediastinal   hemorrhage.   2. Stable infrarenal abdominal aortic aneurysm measuring 6.5 cm.   3. 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      Nonvascular:   1. Stable large left and moderate right pleural effusions. Complete atelectasis   of the left lower lobe, and

## 2024-09-23 NOTE — PROGRESS NOTES
3330 - Report received from KARL Blanton    8519 - pt transferred to CT with RT    1005 - arrived to CT    1020 - CT complete    1030 - returned to CCU    1140 - AM rounds complete, new orders placed. Per Gloria WRIGHT, CTA reviewed, provider spoke with Dr. Tenorio/Vascular no need for OR at this time.    1235 - Nurse took call to Zee/Daughter, pt ID verified. Family updated on pt status. Daughter reports she will update Spouse/Rebeca on current status and RN will follow up with spouse after family discuss pt status.     1245 - RN took call to Spouse, pt ID verified. Family updated on pt status. Chest Tube consent obtained and placed in file.    1430 -1510: Dr. Castro at bedside. Verbal order received  from MD Gloria for Fentanyl Bolus 50mcg /titrate to 100 mcg post bolus and Precedex titrated to 1mcg prior to procedure. Time out called and charted.  Left side pleural chest tube placed, 2500cc of blood was immediately evacuated from chest cavity.     1600 - SBP 90s, MAP >65, Sandoval gtt titrated to 10 mcg    1700 - MAP at goal 80s, SBP 90s    1930 - Report given to KARL Gallo

## 2024-09-23 NOTE — PROGRESS NOTES
1900 - Bedside and Verbal shift change report given to Franny RN (oncoming nurse) by Raya RN (offgoing nurse). Report included the following information Nurse Handoff Report, Adult Overview, Intake/Output, MAR, Recent Results, Cardiac Rhythm afib PVCs, and Neuro Assessment.      1950 - Shift assessment complete, see flowsheet.    2330 - Neida Perez NP rounding on pt. Chart reviewed. No acute needs at this time.    0000 - Reassessment complete, see flowsheet.    0400 - Reassessment complete, see flowsheet.    0445 - CHG bath complete. Frandy RT assisting with turn and pad change. With turn peak pressures up to 40 and not pulling any volumes. Suctioned ETT and ventilation did not improve. O2 Sat not picking up on monitor. RT then bagged pt. Breath sounds equal and ETT still at 26. New pulse ox placed. Pt now at 96%. RT placed back on vent. Now ventilating well. Ana NP notified of situation. Order for robitussin to help with thick secretions.    0715 - Bedside and Verbal shift change report given to Lilian RN and Rahul RN (oncoming nurse) by Franny RN (offgoing nurse). Report included the following information Nurse Handoff Report, Adult Overview, Intake/Output, MAR, Recent Results, Cardiac Rhythm afib PVCs, and Neuro Assessment.

## 2024-09-23 NOTE — CARE COORDINATION
Care Management Initial Assessment       RUR: 16%  Readmission? No  1st IM letter given? Yes - Given on 9/20/24 by patient access.  1st  letter given: No--N/A           09/23/24 1542   Service Assessment   Patient Orientation Other (see comment)  (Patient is intubated.)   Cognition Other (see comment)  (Patient is intubated)   History Provided By Spouse   Primary Caregiver Self   Support Systems Spouse/Significant Other;Children   Patient's Healthcare Decision Maker is: Legal Next of Kin   PCP Verified by CM Yes   Last Visit to PCP   (Patient saw his pcp on Wednesday9/1824.)   Prior Functional Level Independent in ADLs/IADLs   Current Functional Level Assistance with the following:;Bathing;Dressing;Toileting;Feeding;Mobility   Can patient return to prior living arrangement Other (see comment)  (Wife wants to see how the patient progresses.)   Family able to assist with home care needs: Other (comment)  (Wife wants to see how the patient progresses.)   Would you like for me to discuss the discharge plan with any other family members/significant others, and if so, who?   (Patient has a wife.)   Financial Resources Medicare   Community Resources None   Social/Functional History   Lives With Spouse   Type of Home House  (Lives in a one story home.)   Home Equipment Cane   Active  Yes   Discharge Planning   Type of Residence Other (Comment)  (Wife wants to see how the patient progresses.)   Current Services Prior To Admission Other (Comment)  (Patient's wife states he was in the service however is not connected and does not receive medication or see anyone at the [facility].)   Patient expects to be discharged to: Other (comment)  (Wife wants to see how he progresses.)           Advance Care Planning     General Advance Care Planning (ACP) Conversation    Date of Conversation: 9/23/2024  Conducted with: Patient with Decision Making Capacity and Legal next of kin  Other persons present:

## 2024-09-23 NOTE — PROGRESS NOTES
Critical care interdisciplinary rounds today.  Following members present: Case Management, , Clinical Lead, Diabetes Team, Nursing, Nutrition, Pharmacy, and Physician. Family invited to participate.  Plan of care discussed.  See clinical pathway for plan of care and interventions and desired outcomes.     Central line for access. Difficult  Schultz placed by urology to remain for I/I

## 2024-09-23 NOTE — PLAN OF CARE
Problem: Discharge Planning  Goal: Discharge to home or other facility with appropriate resources  Outcome: Progressing     Problem: Safety - Adult  Goal: Free from fall injury  Outcome: Progressing     Problem: ABCDS Injury Assessment  Goal: Absence of physical injury  Outcome: Progressing  Flowsheets (Taken 9/22/2024 0800 by Raya Louie, RN)  Absence of Physical Injury: Implement safety measures based on patient assessment     Problem: Skin/Tissue Integrity  Goal: Absence of new skin breakdown  Description: 1.  Monitor for areas of redness and/or skin breakdown  2.  Assess vascular access sites hourly  3.  Every 4-6 hours minimum:  Change oxygen saturation probe site  4.  Every 4-6 hours:  If on nasal continuous positive airway pressure, respiratory therapy assess nares and determine need for appliance change or resting period.  Outcome: Progressing     Problem: Safety - Medical Restraint  Goal: Remains free of injury from restraints (Restraint for Interference with Medical Device)  Description: INTERVENTIONS:  1. Determine that other, less restrictive measures have been tried or would not be effective before applying the restraint  2. Evaluate the patient's condition at the time of restraint application  3. Inform patient/family regarding the reason for restraint  4. Q2H: Monitor safety, psychosocial status, comfort, nutrition and hydration  Outcome: Progressing     Problem: Neurosensory - Adult  Goal: Achieves stable or improved neurological status  Outcome: Progressing  Flowsheets (Taken 9/22/2024 0800 by Raya Louie, RN)  Achieves stable or improved neurological status:   Assess for and report changes in neurological status   Maintain blood pressure and fluid volume within ordered parameters to optimize cerebral perfusion and minimize risk of hemorrhage   Monitor temperature, glucose, and sodium. Initiate appropriate interventions as ordered  Goal: Achieves maximal functionality and self  Adult  Goal: Absence of urinary retention  Outcome: Progressing  Goal: Urinary catheter remains patent  Outcome: Progressing  Flowsheets (Taken 9/22/2024 0800 by Raya Louie, RN)  Urinary catheter remains patent: Assess patency of urinary catheter     Problem: Infection - Adult  Goal: Absence of infection at discharge  Outcome: Progressing  Goal: Absence of infection during hospitalization  Outcome: Progressing  Goal: Absence of fever/infection during anticipated neutropenic period  Outcome: Progressing     Problem: Metabolic/Fluid and Electrolytes - Adult  Goal: Electrolytes maintained within normal limits  Outcome: Progressing  Goal: Hemodynamic stability and optimal renal function maintained  Outcome: Progressing  Goal: Glucose maintained within prescribed range  Outcome: Progressing     Problem: Hematologic - Adult  Goal: Maintains hematologic stability  Outcome: Progressing     Problem: Pain  Goal: Verbalizes/displays adequate comfort level or baseline comfort level  Outcome: Progressing  Flowsheets  Taken 9/22/2024 1600 by Raya Louie, RN  Verbalizes/displays adequate comfort level or baseline comfort level:   Assess pain using appropriate pain scale   Implement non-pharmacological measures as appropriate and evaluate response  Taken 9/22/2024 1200 by Raya Louie, RN  Verbalizes/displays adequate comfort level or baseline comfort level:   Assess pain using appropriate pain scale   Administer analgesics based on type and severity of pain and evaluate response   Implement non-pharmacological measures as appropriate and evaluate response  Taken 9/22/2024 0800 by Raya Louie, RN  Verbalizes/displays adequate comfort level or baseline comfort level:   Assess pain using appropriate pain scale   Administer analgesics based on type and severity of pain and evaluate response   Implement non-pharmacological measures as appropriate and evaluate response

## 2024-09-23 NOTE — PROGRESS NOTES
Vascular Surgery Progress Note  Candice Frost ACNP-BC      Date:2024       Room:[address] [place] [address]  Patient Name:Micheal Ohara     YOB: 1935     Age:88 y.o.    Subjective      Mr. Micheal Oahra is an 88 y.o. male with a pmhx significant for HTN, aflutter, and BPH.  He is a non-smoker.  He is admitted to the hospital with a ruptured descending TAAA.  He is s/p emergent TEVAR on 24.  His post procedure CTA is stable from a vascular standpoint.  He has a LT hemothorax.     Objective           Vitals Last 24 Hours:  TEMPERATURE:  Temp  Av.5 °F (36.9 °C)  Min: 97.4 °F (36.3 °C)  Max: 99.5 °F (37.5 °C)  RESPIRATIONS RANGE: Resp  Av  Min: 16  Max: 18  PULSE OXIMETRY RANGE: SpO2  Av.3 %  Min: 91 %  Max: 96 %  PULSE RANGE: Pulse  Av  Min: 61  Max: 85  BLOOD PRESSURE RANGE: Systolic (24hrs), Av , Min:102 , Max:106   ; Diastolic (24hrs), Av, Min:83, Max:95    I/O (24Hr):    Intake/Output Summary (Last 24 hours) at 2024 1453  Last data filed at 2024 1400  Gross per 24 hour   Intake 1773.04 ml   Output 1800 ml   Net -26.96 ml     Objective:  Vital signs: (most recent): Blood pressure 102/83, pulse 80, temperature 97.4 °F (36.3 °C), temperature source Axillary, resp. rate 18, height 1.905 m (6' 3\"), weight 110.4 kg (243 lb 6.2 oz), SpO2 96%.  Vital signs are normal.  No fever.    Output: Producing urine.    Lungs:  Normal effort and normal respiratory rate.  (Intubated )  Heart: Normal rate.    Abdomen: Abdomen is non-distended.    Extremities: (Warm and perfused )    Labs    Labs:  CBC:  Recent Labs     24  0407 24  0237 24  0416   WBC 11.7* 12.8* 12.3*   RBC 3.44* 3.33* 3.37*   HGB 10.1* 9.9* 10.0*   HCT 31.7* 29.5* 30.1*   MCV 92.2 88.6 89.3   RDW 15.6* 15.7* 16.3*   * 117* 115*     CHEMISTRIES:  Recent Labs     24  0407 24  0237 24  0416    141 141   K 5.0 4.2 4.1    109* 112*   CO2 25 25 23   BUN 39* 48* 42*    CREATININE 1.73* 1.66* 1.25   GLUCOSE 137* 140* 126*   PHOS 4.1 2.7 1.9*   MG 2.1 2.2 2.4       Assessment//Plan           Ruptured thoracoabdominal aortic aneurysm  -Status post emergent TEVAR 9/21  Postprocedure CTA is stable.   Left hemothorax versus pleural effusion  The patient can be extubated from a vascular standpoint when deemed appropriate from a critical care standpoint  Management per critical care team    Anemia of unclear etiology  -Likely present on admission  -Stable  Postprocedure thrombocytopenia  -As expected    Hypophosphatasia    Hypertension  History of atrial flutter  -Currently rate controlled    Benign prostatic hyperplasia    Management of comorbid conditions per primary team    VTE prophylaxis: Can be resumed    Disposition: To be determined    Electronically signed by Candice BLOUNT-BC

## 2024-09-24 ENCOUNTER — APPOINTMENT (OUTPATIENT)
Facility: HOSPITAL | Age: 89
DRG: 219 | End: 2024-09-24
Payer: MEDICARE

## 2024-09-24 LAB
ABO + RH BLD: NORMAL
ANION GAP SERPL CALC-SCNC: 6 MMOL/L (ref 2–12)
ARTERIAL PATENCY WRIST A: YES
BASE EXCESS BLDA CALC-SCNC: 0.2 MMOL/L
BDY SITE: ABNORMAL
BLD PROD TYP BPU: NORMAL
BLD PROD TYP BPU: NORMAL
BLOOD BANK DISPENSE STATUS: NORMAL
BLOOD BANK DISPENSE STATUS: NORMAL
BLOOD GROUP ANTIBODIES SERPL: NORMAL
BPU ID: NORMAL
BPU ID: NORMAL
BUN SERPL-MCNC: 34 MG/DL (ref 6–20)
BUN/CREAT SERPL: 31 (ref 12–20)
CALCIUM SERPL-MCNC: 7.8 MG/DL (ref 8.5–10.1)
CHLORIDE SERPL-SCNC: 115 MMOL/L (ref 97–108)
CO2 SERPL-SCNC: 23 MMOL/L (ref 21–32)
CREAT SERPL-MCNC: 1.11 MG/DL (ref 0.7–1.3)
CROSSMATCH RESULT: NORMAL
CROSSMATCH RESULT: NORMAL
EKG ATRIAL RATE: 123 BPM
EKG DIAGNOSIS: NORMAL
EKG P AXIS: -21 DEGREES
EKG P-R INTERVAL: 140 MS
EKG Q-T INTERVAL: 348 MS
EKG QRS DURATION: 126 MS
EKG QTC CALCULATION (BAZETT): 498 MS
EKG R AXIS: -89 DEGREES
EKG T AXIS: 62 DEGREES
EKG VENTRICULAR RATE: 123 BPM
ERYTHROCYTE [DISTWIDTH] IN BLOOD BY AUTOMATED COUNT: 16 % (ref 11.5–14.5)
GLUCOSE BLD STRIP.AUTO-MCNC: 109 MG/DL (ref 65–117)
GLUCOSE BLD STRIP.AUTO-MCNC: 115 MG/DL (ref 65–117)
GLUCOSE SERPL-MCNC: 118 MG/DL (ref 65–100)
HCO3 BLDA-SCNC: 25 MMOL/L (ref 22–26)
HCT VFR BLD AUTO: 29 % (ref 36.6–50.3)
HGB BLD-MCNC: 9.5 G/DL (ref 12.1–17)
MAGNESIUM SERPL-MCNC: 2.4 MG/DL (ref 1.6–2.4)
MCH RBC QN AUTO: 29.5 PG (ref 26–34)
MCHC RBC AUTO-ENTMCNC: 32.8 G/DL (ref 30–36.5)
MCV RBC AUTO: 90.1 FL (ref 80–99)
NRBC # BLD: 0 K/UL (ref 0–0.01)
NRBC BLD-RTO: 0 PER 100 WBC
PCO2 BLDA: 41 MMHG (ref 35–45)
PH BLDA: 7.4 (ref 7.35–7.45)
PHOSPHATE SERPL-MCNC: 2.7 MG/DL (ref 2.6–4.7)
PLATELET # BLD AUTO: 104 K/UL (ref 150–400)
PMV BLD AUTO: 10.2 FL (ref 8.9–12.9)
PO2 BLDA: 58 MMHG (ref 80–100)
POTASSIUM SERPL-SCNC: 3.8 MMOL/L (ref 3.5–5.1)
RBC # BLD AUTO: 3.22 M/UL (ref 4.1–5.7)
SAO2 % BLD: 90 % (ref 92–97)
SAO2% DEVICE SAO2% SENSOR NAME: ABNORMAL
SERVICE CMNT-IMP: NORMAL
SERVICE CMNT-IMP: NORMAL
SODIUM SERPL-SCNC: 144 MMOL/L (ref 136–145)
SPECIMEN EXP DATE BLD: NORMAL
SPECIMEN SITE: ABNORMAL
UNIT DIVISION: 0
UNIT DIVISION: 0
VENTILATION MODE VENT: ABNORMAL
WBC # BLD AUTO: 10.7 K/UL (ref 4.1–11.1)

## 2024-09-24 PROCEDURE — 2500000003 HC RX 250 WO HCPCS: Performed by: INTERNAL MEDICINE

## 2024-09-24 PROCEDURE — 2580000003 HC RX 258: Performed by: SURGERY

## 2024-09-24 PROCEDURE — 82962 GLUCOSE BLOOD TEST: CPT

## 2024-09-24 PROCEDURE — 2580000003 HC RX 258: Performed by: NURSE PRACTITIONER

## 2024-09-24 PROCEDURE — 6360000002 HC RX W HCPCS: Performed by: INTERNAL MEDICINE

## 2024-09-24 PROCEDURE — 2580000003 HC RX 258: Performed by: INTERNAL MEDICINE

## 2024-09-24 PROCEDURE — 6360000002 HC RX W HCPCS: Performed by: NURSE PRACTITIONER

## 2024-09-24 PROCEDURE — 94002 VENT MGMT INPAT INIT DAY: CPT

## 2024-09-24 PROCEDURE — 83735 ASSAY OF MAGNESIUM: CPT

## 2024-09-24 PROCEDURE — 2500000003 HC RX 250 WO HCPCS: Performed by: NURSE PRACTITIONER

## 2024-09-24 PROCEDURE — 93971 EXTREMITY STUDY: CPT

## 2024-09-24 PROCEDURE — 6360000002 HC RX W HCPCS

## 2024-09-24 PROCEDURE — 36415 COLL VENOUS BLD VENIPUNCTURE: CPT

## 2024-09-24 PROCEDURE — 2580000003 HC RX 258

## 2024-09-24 PROCEDURE — 5A1945Z RESPIRATORY VENTILATION, 24-96 CONSECUTIVE HOURS: ICD-10-PCS | Performed by: INTERNAL MEDICINE

## 2024-09-24 PROCEDURE — 6370000000 HC RX 637 (ALT 250 FOR IP): Performed by: NURSE PRACTITIONER

## 2024-09-24 PROCEDURE — 84100 ASSAY OF PHOSPHORUS: CPT

## 2024-09-24 PROCEDURE — 82803 BLOOD GASES ANY COMBINATION: CPT

## 2024-09-24 PROCEDURE — 85027 COMPLETE CBC AUTOMATED: CPT

## 2024-09-24 PROCEDURE — 94003 VENT MGMT INPAT SUBQ DAY: CPT

## 2024-09-24 PROCEDURE — 6370000000 HC RX 637 (ALT 250 FOR IP): Performed by: INTERNAL MEDICINE

## 2024-09-24 PROCEDURE — 36600 WITHDRAWAL OF ARTERIAL BLOOD: CPT

## 2024-09-24 PROCEDURE — 71045 X-RAY EXAM CHEST 1 VIEW: CPT

## 2024-09-24 PROCEDURE — 80048 BASIC METABOLIC PNL TOTAL CA: CPT

## 2024-09-24 PROCEDURE — 2000000000 HC ICU R&B

## 2024-09-24 RX ORDER — TAMSULOSIN HYDROCHLORIDE 0.4 MG/1
0.4 CAPSULE ORAL EVERY MORNING
COMMUNITY

## 2024-09-24 RX ORDER — APIXABAN 5 MG/1
5 TABLET, FILM COATED ORAL 2 TIMES DAILY
COMMUNITY
Start: 2024-09-18

## 2024-09-24 RX ORDER — LIDOCAINE 4 G/G
1 PATCH TOPICAL DAILY
Status: DISCONTINUED | OUTPATIENT
Start: 2024-09-24 | End: 2024-09-26

## 2024-09-24 RX ORDER — ENOXAPARIN SODIUM 100 MG/ML
30 INJECTION SUBCUTANEOUS 2 TIMES DAILY
Status: DISCONTINUED | OUTPATIENT
Start: 2024-09-24 | End: 2024-09-28

## 2024-09-24 RX ORDER — ETOMIDATE 2 MG/ML
20 INJECTION INTRAVENOUS ONCE
Status: COMPLETED | OUTPATIENT
Start: 2024-09-24 | End: 2024-09-24

## 2024-09-24 RX ORDER — SUCCINYLCHOLINE/SOD CL,ISO/PF 200MG/10ML
70 SYRINGE (ML) INTRAVENOUS ONCE
Status: COMPLETED | OUTPATIENT
Start: 2024-09-24 | End: 2024-09-24

## 2024-09-24 RX ORDER — FENTANYL CITRATE-0.9 % NACL/PF 10 MCG/ML
25-200 PLASTIC BAG, INJECTION (ML) INTRAVENOUS CONTINUOUS
Status: DISCONTINUED | OUTPATIENT
Start: 2024-09-24 | End: 2024-09-26

## 2024-09-24 RX ADMIN — ENOXAPARIN SODIUM 30 MG: 100 INJECTION SUBCUTANEOUS at 21:03

## 2024-09-24 RX ADMIN — CHLORHEXIDINE GLUCONATE 15 ML: 1.2 RINSE ORAL at 08:17

## 2024-09-24 RX ADMIN — Medication 1 AMPULE: at 21:04

## 2024-09-24 RX ADMIN — SODIUM CHLORIDE, PRESERVATIVE FREE 10 ML: 5 INJECTION INTRAVENOUS at 08:17

## 2024-09-24 RX ADMIN — CHLORHEXIDINE GLUCONATE 15 ML: 1.2 RINSE ORAL at 21:03

## 2024-09-24 RX ADMIN — SODIUM CHLORIDE, POTASSIUM CHLORIDE, SODIUM LACTATE AND CALCIUM CHLORIDE: 600; 310; 30; 20 INJECTION, SOLUTION INTRAVENOUS at 03:47

## 2024-09-24 RX ADMIN — Medication: at 08:17

## 2024-09-24 RX ADMIN — FAMOTIDINE 20 MG: 10 INJECTION, SOLUTION INTRAVENOUS at 08:17

## 2024-09-24 RX ADMIN — GUAIFENESIN 400 MG: 200 SOLUTION ORAL at 17:49

## 2024-09-24 RX ADMIN — Medication: at 21:03

## 2024-09-24 RX ADMIN — GUAIFENESIN 400 MG: 200 SOLUTION ORAL at 11:09

## 2024-09-24 RX ADMIN — DEXMEDETOMIDINE HYDROCHLORIDE 0.2 MCG/KG/HR: 400 INJECTION, SOLUTION INTRAVENOUS at 15:16

## 2024-09-24 RX ADMIN — FENTANYL CITRATE 25 MCG: 50 INJECTION INTRAMUSCULAR; INTRAVENOUS at 09:42

## 2024-09-24 RX ADMIN — ETOMIDATE 20 MG: 2 INJECTION, SOLUTION INTRAVENOUS at 15:23

## 2024-09-24 RX ADMIN — GUAIFENESIN 400 MG: 200 SOLUTION ORAL at 03:56

## 2024-09-24 RX ADMIN — Medication 1 AMPULE: at 08:17

## 2024-09-24 RX ADMIN — Medication 70 MG: at 15:23

## 2024-09-24 RX ADMIN — SODIUM CHLORIDE, PRESERVATIVE FREE 10 ML: 5 INJECTION INTRAVENOUS at 21:04

## 2024-09-24 RX ADMIN — GUAIFENESIN 400 MG: 200 SOLUTION ORAL at 23:21

## 2024-09-24 RX ADMIN — SODIUM CHLORIDE, POTASSIUM CHLORIDE, SODIUM LACTATE AND CALCIUM CHLORIDE: 600; 310; 30; 20 INJECTION, SOLUTION INTRAVENOUS at 18:40

## 2024-09-24 RX ADMIN — ENOXAPARIN SODIUM 30 MG: 100 INJECTION SUBCUTANEOUS at 13:24

## 2024-09-24 RX ADMIN — PHENYLEPHRINE HYDROCHLORIDE 15 MCG/MIN: 10 INJECTION INTRAVENOUS at 03:54

## 2024-09-24 ASSESSMENT — PULMONARY FUNCTION TESTS
PIF_VALUE: 35
PIF_VALUE: 33
PIF_VALUE: 30
PIF_VALUE: 27

## 2024-09-24 ASSESSMENT — PAIN SCALES - GENERAL
PAINLEVEL_OUTOF10: 0
PAINLEVEL_OUTOF10: 5
PAINLEVEL_OUTOF10: 0

## 2024-09-24 NOTE — PROGRESS NOTES
OT note:  OT orders received and chart was reviewed, and pt was extubated this am.  Spoke with nsg and pt is very lethargic.  Asked to hold for OT eval, and will continue to follow and eval pt when he is medically appropriate.

## 2024-09-24 NOTE — PROGRESS NOTES
Patient was extubated this morning but is unable to handle secretions. His cough is not good enough to clear secretions and he is becoming hypoxic.     I spoke to daughter and wife at bedside and gave them some options (including not intubating) - also discussed what if unable to get extubated after re-intubation. They have not thought about it and wants to proceed with intubation.     I will obtain palliative consult after intubation to support family.     Steven Castro MD, FCCP, FCCM, ATSF, FACP, DAABIP  Interventional Pulmonology/Critical Care Medicine  [facility]  [facility]

## 2024-09-24 NOTE — PROGRESS NOTES
0700:  Bedside report received from KARL Gallo.     0812:  Patient extubated to 6 L NC.   0900:  Notified Dr. Castro patient sounds junky and sats keep dropping in 80's.  Encouraged to get patient to cough.  Patient's cough is too weak to bring up secretions.     1045:  Rounds with Dr. Castro, notified him my concerns of patient's respiratory status. Abdominal breathing, sats in 80's and very coarse.  Order received for respiratory to NT suction frequently.      1445:  Called respiratory to bedside to NT suction.  Small amount of secretions suctioned.  Patient still very wet sounding.     1500: Dr. Castro called to the bedside as patient's Respiratory status is declining rapidly. Audible gurgling present, even after patient was deep suctioned by RT and orally suctioned by nursing staff several times.     Pt. On 6 liters NC, Oxygen saturation in the low 80's. Pt. Tachycardic, HR in the 120's.     Dr. Castro spoke to his family. Decision was made to re-intubate patient for Respiratory failure. Received verbal order for Etomidate IVP  20mg and Succinylcholine IVP 70 mg. Precedex and fentanyl infusions for Continuous sedation while on the ventilator.     1516: Precedex now started.     1520: TIMEOUT now completed with Dr. Castro.     1523: Etomidate and Succs now given.     1525: Pt. Now intubated by Dr. Castro. ETT 7.5, 22 cm at the lip. CXR ordered now per Dr. Castro for placement.

## 2024-09-24 NOTE — PROGRESS NOTES
Critical care interdisciplinary rounds today.  Following members present: Case Management, , Clinical Lead, Diabetes Team, Nursing, Nutrition, Pharmacy, and Physician. Family invited to participate.  Plan of care discussed.  See clinical pathway for plan of care and interventions and desired outcomes.     Central line remains for surgery.  Urology koch remains due to difficult insertion.

## 2024-09-24 NOTE — PROGRESS NOTES
Vascular Surgery Progress Note  Candice Frost ACNP-BC      Date:2024       Room:[address] [place] [address]  Patient Name:Micheal Ohara     YOB: 1935     Age:88 y.o.    Subjective      Mr. Micheal Ohara is an 88 y.o. male with a pmhx significant for HTN, aflutter, and BPH.  He is a non-smoker.  He is admitted to the hospital with a ruptured descending TAAA.  He is s/p emergent TEVAR on 24.  His post procedure CTA is stable from a vascular standpoint.  He has a LT hemothorax.     Objective           Vitals Last 24 Hours:  TEMPERATURE:  Temp  Av.1 °F (36.7 °C)  Min: 97.4 °F (36.3 °C)  Max: 98.4 °F (36.9 °C)  RESPIRATIONS RANGE: Resp  Av.2  Min: 14  Max: 28  PULSE OXIMETRY RANGE: SpO2  Av.9 %  Min: 77 %  Max: 100 %  PULSE RANGE: Pulse  Av.3  Min: 64  Max: 123  BLOOD PRESSURE RANGE: Systolic (24hrs), Av , Min:69 , Max:122   ; Diastolic (24hrs), Av, Min:55, Max:93    I/O (24Hr):    Intake/Output Summary (Last 24 hours) at 2024 1040  Last data filed at 2024 1013  Gross per 24 hour   Intake 3217.26 ml   Output 2315 ml   Net 902.26 ml     Objective:  General Appearance:  General patient appearance: Difficult to arouse.    Vital signs: (most recent): Blood pressure 122/79, pulse (!) 123, temperature 98.4 °F (36.9 °C), temperature source Axillary, resp. rate 19, height 1.905 m (6' 3\"), weight 109.7 kg (241 lb 13.5 oz), SpO2 (!) 77%.  Vital signs are normal.  No fever.    Output: Producing urine.    HEENT: (NGT)    Lungs:  Normal effort and normal respiratory rate.  (Extubated.  Oxygen via nasal cannula.)  Heart: Normal rate.    Chest: (Left lateral chest tube with bloody drainage)  Abdomen: Abdomen is non-distended.    Extremities: (Warm and perfused )  Pupils:  (Groin sites clean, dry, and intact).      Labs    Labs:  CBC:  Recent Labs     24  0416 24  2213 24  0358   WBC 12.3* 11.5* 10.7   RBC 3.37* 3.26* 3.22*   HGB 10.0* 9.9* 9.5*   HCT 30.1* 29.4* 29.0*

## 2024-09-24 NOTE — PROGRESS NOTES
Attended Interdisciplinary rounds in CCU; patient care was discussed.    CHEIKH Moore, BCC, Staff [facility] Paging Service  [phone])

## 2024-09-24 NOTE — OP NOTE
[facility]              [address] [place] [address]                            OPERATIVE REPORT      PATIENT NAME: MIHAELA CRUZ               : 1935  MED REC NO: [id number]                       ROOM: 2522  ACCOUNT NO: [id number]                       ADMIT DATE: 2024  PROVIDER: Carroll Lomax MD    DATE OF SERVICE:  2024    PREOPERATIVE DIAGNOSES:  Thoracic aortic aneurysm with ruptured segment.    POSTOPERATIVE DIAGNOSES:  Thoracic aortic aneurysm with ruptured segment.    PROCEDURES PERFORMED:  Percutaneous thoracic endovascular ruptured aortic aneurysm repair, IVUS catheterization of aortic arch and descending thoracic aorta.    SURGEON:  Carroll Lomax MD    ASSISTANT:  Vera Millard SA    ANESTHESIA:  General.    ESTIMATED BLOOD LOSS:  100 mL.    SPECIMENS REMOVED:  None.    INTRAOPERATIVE FINDINGS:  ***     COMPLICATIONS:  None.    IMPLANTS:  Medtronic Valiant 42 x 38 tapered x 150 mm graft and a 40 x 100 mm Valiant graft.    INDICATIONS:  The patient is an 88-year-old gentleman, who is otherwise healthy, who has presented with shortness of breath and CTA evidence of what looks like a large intramural hematoma versus a thoracic aortic aneurysm rupture.  Based on his clinical course, it appears to be acting like a localized rupture of a thoracic aortic aneurysm.  He presents for emergent endovascular repair.  Risks and benefits of the procedure were discussed with the patient in detail as well as the family.    DESCRIPTION OF PROCEDURE:  The patient was placed supine on the operating room table and general anesthesia was established.  The left neck and chest and both groins were prepped and draped in the standard surgical fashion.  Using real-time ultrasound guidance, the right common femoral artery was accessed with a micropuncture system and it was exchanged for a 6-Anguillan sheath. Likewise, using real-time ultrasound guidance in

## 2024-09-24 NOTE — PROGRESS NOTES
1900: report from Lilian/KARL Kay    2030: Pt restless/agitated, NP notified, Verbal to restart precedex, see order for PRN Fentanyl    2200: Pt hypotensive, see titrations, NP notified of increased pressor requirements, updated with pt status. See orders for 500cc LR bolus, recheck labs.     2300: NP updated with pt status/labs, notified of concern difference in BP between L/R arms, L arm is cooler to touch, edematous, doppler pulses, reviewed CT findings. Verbal order to go off of BP in R radial Art line, and notify if unable to obtain pulses.     0130: Additional rounds w/ NP, verbal order to remove LAC piv, keep L arm elevated, new order for venous duplex of BARBARA.     0400: CHG bathing complete, Pt pass SAT, RT notified to proceed with SBT    0432: SBT started by RT    0530: Pt tachycardic 120s, NP notified. Verbal to restart dex, keep on sbt.     0700: Report to KARL Andrade

## 2024-09-24 NOTE — PROGRESS NOTES
Order received, chart reviewed and discussed with nursing. Pt just extubated and very lethargic. Not appropriate for PT eval today. Will continue to follow.

## 2024-09-24 NOTE — PROCEDURES
[facility]  Pulmonary, Critical Care, and Sleep Medicine    Name: Micheal Ohara MRN: [id number]   : 1935 Hospital: [facility]   Date: 2024        Intubation Note    Procedure: elective/emergent intubation    Indication: respiratory insufficiency    Anesthesia- Rapid sequence:   Etomidate 20 mg   Paralysis: Succinylcholine 70 mg    After assessing the airway, the patient underwent preoxygenation with 100% FiO2 for 5 min. Etomidate and succnylcholine was given sequentially in rapid IV push.  After adequate sedation and paralisys emergent intubation was performed.      A 4.0MAC, blade video laryngoscope was used to visualize the epiglottis and vocal chords.    After positive identification of the vocal cords, a 7.5 ET tube was placed into the trachea with direct visualization.    The tube was seen passing through the vocal chords without difficulty.  CO2 colorimetry was employed immediately to verify tube in airway with appropriate color change indicating detection of CO2.    Water vapor was seen within the ET tube, and auscultation of the abdomen revealed no bubbling souds.    Auscultation  and inspection of the chest after intubation showed symmetric chest excursion and symmetric air entry bilaterally.    Chest X-ray has been ordered and is pending.    The patient has been placed on a mechanical ventilator.    There were no complications.    Steven Castro MD

## 2024-09-24 NOTE — PROGRESS NOTES
findings as detailed above concerning for rupture of penetrating   atherosclerotic ulcer versus dissection tracking along the aorta and great   vessels with concerns of communication to the left pleural space. Surgical   consultation is advised.      Right intermediate attenuating pleural fluid is indeterminate.      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      Aneurysms and vascular irregularities of the bilateral common iliac, bilateral   internal iliac, and right renal artery.      Left lower lobe groundglass and solid opacities indeterminate for atelectasis   versus infectious/inflammatory process. Clinical correlation is advised.      Cardiomegaly with right atrial enlargement.      Age-indeterminate compression deformity of the T12 vertebral body with severe   height loss. Correlation with point tenderness is advised.      Other incidental findings as above.      --Communication   Preliminary critical findings were phoned to Bruno Quinonez MD  by Dr. Mitesh Muller at 9/20/2024 7:01 PM.   --         Electronically signed by Mitesh Muller      XR CHEST PORTABLE   Final Result   Widening of the mediastinum concerning for aneurysm or dissection. CTA chest is   recommended. There is a small left-sided pleural effusion.      The findings were called to Dr. Quinonez on  9/20/2024 at 1808 hours EST by Dr. Willis.      789               Electronically signed by BROCK LOPEZ XR TECHNOLOGIST SERVICE    (Results Pending)   Vascular duplex upper extremity venous left    (Results Pending)   XR CHEST PORTABLE    (Results Pending)       Assessment and Plan:    S/p TEVAR from L CCA w/ 2 stent grafts - 09/21   Serial neurovascular checks  CTA done on 9/23 - the graft appears in good position, no intervention recommended at this time.      Postop mechanical ventilation/Respiratory insufficiency  Lung protective ventilation with goal plateau pressure < 30, driving

## 2024-09-25 LAB
ANION GAP SERPL CALC-SCNC: 4 MMOL/L (ref 2–12)
BUN SERPL-MCNC: 32 MG/DL (ref 6–20)
BUN/CREAT SERPL: 26 (ref 12–20)
CALCIUM SERPL-MCNC: 7.9 MG/DL (ref 8.5–10.1)
CHLORIDE SERPL-SCNC: 115 MMOL/L (ref 97–108)
CO2 SERPL-SCNC: 27 MMOL/L (ref 21–32)
CREAT SERPL-MCNC: 1.22 MG/DL (ref 0.7–1.3)
ERYTHROCYTE [DISTWIDTH] IN BLOOD BY AUTOMATED COUNT: 15.9 % (ref 11.5–14.5)
GLUCOSE BLD STRIP.AUTO-MCNC: 118 MG/DL (ref 65–117)
GLUCOSE SERPL-MCNC: 122 MG/DL (ref 65–100)
HCT VFR BLD AUTO: 28.7 % (ref 36.6–50.3)
HGB BLD-MCNC: 9.2 G/DL (ref 12.1–17)
MAGNESIUM SERPL-MCNC: 2.5 MG/DL (ref 1.6–2.4)
MCH RBC QN AUTO: 29.3 PG (ref 26–34)
MCHC RBC AUTO-ENTMCNC: 32.1 G/DL (ref 30–36.5)
MCV RBC AUTO: 91.4 FL (ref 80–99)
NRBC # BLD: 0 K/UL (ref 0–0.01)
NRBC BLD-RTO: 0 PER 100 WBC
PHOSPHATE SERPL-MCNC: 3 MG/DL (ref 2.6–4.7)
PLATELET # BLD AUTO: 103 K/UL (ref 150–400)
PMV BLD AUTO: 10.7 FL (ref 8.9–12.9)
POTASSIUM SERPL-SCNC: 4.1 MMOL/L (ref 3.5–5.1)
RBC # BLD AUTO: 3.14 M/UL (ref 4.1–5.7)
SERVICE CMNT-IMP: ABNORMAL
SODIUM SERPL-SCNC: 146 MMOL/L (ref 136–145)
WBC # BLD AUTO: 7.8 K/UL (ref 4.1–11.1)

## 2024-09-25 PROCEDURE — 2500000003 HC RX 250 WO HCPCS: Performed by: NURSE PRACTITIONER

## 2024-09-25 PROCEDURE — 80048 BASIC METABOLIC PNL TOTAL CA: CPT

## 2024-09-25 PROCEDURE — 2580000003 HC RX 258: Performed by: SURGERY

## 2024-09-25 PROCEDURE — 94003 VENT MGMT INPAT SUBQ DAY: CPT

## 2024-09-25 PROCEDURE — 84100 ASSAY OF PHOSPHORUS: CPT

## 2024-09-25 PROCEDURE — 2580000003 HC RX 258: Performed by: NURSE PRACTITIONER

## 2024-09-25 PROCEDURE — 36415 COLL VENOUS BLD VENIPUNCTURE: CPT

## 2024-09-25 PROCEDURE — 82962 GLUCOSE BLOOD TEST: CPT

## 2024-09-25 PROCEDURE — 2000000000 HC ICU R&B

## 2024-09-25 PROCEDURE — 2580000003 HC RX 258: Performed by: INTERNAL MEDICINE

## 2024-09-25 PROCEDURE — 6370000000 HC RX 637 (ALT 250 FOR IP): Performed by: NURSE PRACTITIONER

## 2024-09-25 PROCEDURE — 6370000000 HC RX 637 (ALT 250 FOR IP): Performed by: INTERNAL MEDICINE

## 2024-09-25 PROCEDURE — 83735 ASSAY OF MAGNESIUM: CPT

## 2024-09-25 PROCEDURE — 6360000002 HC RX W HCPCS: Performed by: INTERNAL MEDICINE

## 2024-09-25 PROCEDURE — 85027 COMPLETE CBC AUTOMATED: CPT

## 2024-09-25 RX ORDER — POLYETHYLENE GLYCOL 3350 17 G/17G
17 POWDER, FOR SOLUTION ORAL DAILY
Status: DISCONTINUED | OUTPATIENT
Start: 2024-09-25 | End: 2024-09-28

## 2024-09-25 RX ORDER — SODIUM CHLORIDE, SODIUM LACTATE, POTASSIUM CHLORIDE, AND CALCIUM CHLORIDE .6; .31; .03; .02 G/100ML; G/100ML; G/100ML; G/100ML
500 INJECTION, SOLUTION INTRAVENOUS ONCE
Status: COMPLETED | OUTPATIENT
Start: 2024-09-25 | End: 2024-09-25

## 2024-09-25 RX ADMIN — GUAIFENESIN 400 MG: 200 SOLUTION ORAL at 17:52

## 2024-09-25 RX ADMIN — FAMOTIDINE 20 MG: 10 INJECTION, SOLUTION INTRAVENOUS at 09:10

## 2024-09-25 RX ADMIN — Medication: at 10:56

## 2024-09-25 RX ADMIN — GUAIFENESIN 400 MG: 200 SOLUTION ORAL at 12:14

## 2024-09-25 RX ADMIN — Medication 1 AMPULE: at 09:10

## 2024-09-25 RX ADMIN — Medication 1 AMPULE: at 21:04

## 2024-09-25 RX ADMIN — GUAIFENESIN 400 MG: 200 SOLUTION ORAL at 06:27

## 2024-09-25 RX ADMIN — SODIUM CHLORIDE, POTASSIUM CHLORIDE, SODIUM LACTATE AND CALCIUM CHLORIDE: 600; 310; 30; 20 INJECTION, SOLUTION INTRAVENOUS at 17:10

## 2024-09-25 RX ADMIN — POLYETHYLENE GLYCOL 3350 17 G: 17 POWDER, FOR SOLUTION ORAL at 12:14

## 2024-09-25 RX ADMIN — SODIUM CHLORIDE, POTASSIUM CHLORIDE, SODIUM LACTATE AND CALCIUM CHLORIDE 500 ML: 600; 310; 30; 20 INJECTION, SOLUTION INTRAVENOUS at 11:05

## 2024-09-25 RX ADMIN — Medication: at 21:02

## 2024-09-25 RX ADMIN — SODIUM CHLORIDE, POTASSIUM CHLORIDE, SODIUM LACTATE AND CALCIUM CHLORIDE: 600; 310; 30; 20 INJECTION, SOLUTION INTRAVENOUS at 09:24

## 2024-09-25 RX ADMIN — DEXMEDETOMIDINE HYDROCHLORIDE 0.4 MCG/KG/HR: 400 INJECTION, SOLUTION INTRAVENOUS at 10:49

## 2024-09-25 RX ADMIN — DEXMEDETOMIDINE HYDROCHLORIDE 0.4 MCG/KG/HR: 400 INJECTION, SOLUTION INTRAVENOUS at 00:23

## 2024-09-25 RX ADMIN — SODIUM CHLORIDE, PRESERVATIVE FREE 10 ML: 5 INJECTION INTRAVENOUS at 10:56

## 2024-09-25 RX ADMIN — CHLORHEXIDINE GLUCONATE 15 ML: 1.2 RINSE ORAL at 21:03

## 2024-09-25 RX ADMIN — DEXMEDETOMIDINE HYDROCHLORIDE 0.4 MCG/KG/HR: 400 INJECTION, SOLUTION INTRAVENOUS at 20:53

## 2024-09-25 RX ADMIN — SODIUM CHLORIDE, PRESERVATIVE FREE 10 ML: 5 INJECTION INTRAVENOUS at 09:00

## 2024-09-25 RX ADMIN — CHLORHEXIDINE GLUCONATE 15 ML: 1.2 RINSE ORAL at 09:10

## 2024-09-25 RX ADMIN — ENOXAPARIN SODIUM 30 MG: 100 INJECTION SUBCUTANEOUS at 09:11

## 2024-09-25 RX ADMIN — SODIUM CHLORIDE, PRESERVATIVE FREE 10 ML: 5 INJECTION INTRAVENOUS at 21:03

## 2024-09-25 RX ADMIN — ENOXAPARIN SODIUM 30 MG: 100 INJECTION SUBCUTANEOUS at 21:03

## 2024-09-25 ASSESSMENT — PAIN SCALES - GENERAL
PAINLEVEL_OUTOF10: 0

## 2024-09-25 ASSESSMENT — PULMONARY FUNCTION TESTS
PIF_VALUE: 23
PIF_VALUE: 29
PIF_VALUE: 28
PIF_VALUE: 22
PIF_VALUE: 27
PIF_VALUE: 34

## 2024-09-25 NOTE — CONSULTS
Palliative Medicine  Patient Name: Micheal Ohara  YOB: 1935  MRN: [id number]  Age: 88 y.o.  Gender: male    Date of Initial Consult: 9/26/24  Date of Service: 9/27/2024  Time: 3:31 PM  Provider: EDEL Skinner NP  Hospital Day: 8  Admit Date: 9/20/2024  Referring Provider: Steven Castro MD    Reasons for Consultation:  Goals of Care    HISTORY OF PRESENT ILLNESS (HPI):   Micheal Ohara is a 88 y.o. male with a past medical history of a-flutter, and htn, who was admitted on 9/20/2024 from home with a diagnosis of thoracic aortic IMH/Aneurysm rupture.  He was taken emergently to the OR for TEVAR with 2 stent grafts.  He was brougth to the ICU post surgery intubated on pressors.    9/23:  Massive pleural effusion with atelectasis- chest tube placed for likely hemothorax.  3l bloody fluid removed  9/24:  Extubated in the AM, but due to inability to handle his secretions, he was re-intubated by the afternoon.   9/26:  Plan for bronch today     Psychosocial: Active, still drives, independent with ADLs, likes to work in his garage and yard when he is home    PALLIATIVE DIAGNOSES:    Acute hypoxic respiratory failure  Pleural effusions  L Hemothorax  Severe Bronchitis  Goals of care    ASSESSMENT AND PLAN:   Met with Mrs Ohara and their daughter Laura pires, I met with them when Mrs Ohara was hospitalized last year, and gave Mr Ohara and AMD to do with his medical team.  I don't think he ever did it....  His wife has a POST form, so will try and do one with him if he is able to participate later on this hospitalization  We discussed this hospitalization, failed extubation earlier, plan for extubation tomorrow/this weekend, and possible long term outcomes  Discussed one way extubation as an option- as a 3rd intubation resulting in a longer ICU stay risks having irreversible functional consequences for an 88 year old.  Mrs Ohara is pretty sure that her  would NOT want to be

## 2024-09-25 NOTE — PROGRESS NOTES
0700:  Bedside report received from KARL Salmeron.     1100:  Rounds with Dr. Castro.  Plan for LR bolus for  decreased urine output.  Plan to start tube feeds.    1750:  Started tube feeds at 15mL/hr with 200 ml water flush Q4H.

## 2024-09-25 NOTE — PROGRESS NOTES
Noted change in medical status and pt needed to be re-intubated.  Will defer but continue to follow.

## 2024-09-25 NOTE — PROGRESS NOTES
Spiritual Health History and Assessment/Progress Note  [facility]    Attempted Encounter, Palliative Care,  ,  ,      Name: Micheal Ohara MRN: [id number]    Age: 88 y.o.     Sex: male   Language: English   Buddhist: None   Dissecting aneurysm of aorta (HCC)     Date: 9/25/2024            Total Time Calculated: 11 min              Spiritual Assessment began in [facility] CRITICAL CARE        Referral/Consult From: Rounding   Encounter Overview/Reason: Attempted Encounter, Palliative Care  Service Provided For: Patient not available    Antonette, Belief, Meaning:   Patient unable to assess at this time  Family/Friends No family/friends present      Importance and Influence:  Patient Other: patient intubated  Family/Friends No family/friends present    Community:  Patient Other: patient intubated  Family/Friends No family/friends present    Assessment and Plan of Care:     Patient Interventions include: Other: patient intubated  Family/Friends Interventions include: No family/friends present    Patient Plan of Care: Spiritual Care available upon further referral  Family/Friends Plan of Care: No family/friends present    Unable to assess for spiritual needs or concerns as patient is intubated.      CHEIKH Moore, BCC, Staff   [facility]     Paging Service [phone]      Electronically signed by Chaplain CHRIS [facility] BCC on 9/25/2024 at 10:00 AM

## 2024-09-25 NOTE — PROGRESS NOTES
Comprehensive Nutrition Assessment    Type and Reason for Visit:  Initial, Consult, NPO/Clear Liquid    Nutrition Recommendations/Plan:   Initiate TF via NGT:  Start Jevity 1.5 @ 15ml/h, advance rate 10mL q 6h as tolerated to Goal of 55mL/h + Prosource daily + 200mL flush q 4h (provides 2060kcals/104gPro/284gCHO/2203mL)      Malnutrition Assessment:  Malnutrition Status:  Insufficient data (09/25/24 1408)        Nutrition Assessment:  Pt admitted with dissecting aortic aneurysm.  PMH: HTN.  Chart reviewed, case discussed during CCU rounds.  Pt intubated, he was extubated yesterday but failed and was quickly reintubated yesterday afternoon.  No family in the room to speak with.  MST negative for wt loss but + for poor appetite.  Limited NFPE negative for fat/muscle wasting.  NGT in place.  Pt is NPO x 5 days.  Discussed TF initiation during IDR's to which Intensivist was agreeable.  See orders above.  No BM noted this admission, will also recommend adding a scheduled bowel regimen.  Na 146 (he is on LR @ 100mL/h) will start liberal water flushes.  Lytes WNL otherwise.  TF at goal will meet 100% kcal and protein needs.     Wt Readings from Last 10 Encounters:   09/24/24 109.7 kg (241 lb 13.5 oz)            Nutrition Related Findings:    Meds: glycolax, LR@100mL/h.    Edema: +1-generalized, RUE, BLE, +2-LUE.    BM: PTA   Wound Type: Surgical Incision       Current Nutrition Intake & Therapies:    Average Meal Intake: NPO         Anthropometric Measures:  Height: 190.5 cm (6' 3\")  Ideal Body Weight (IBW): 196 lbs (89 kg)       Current Body Weight: 109.7 kg (241 lb 13.5 oz), 123.4 % IBW. Weight Source: Bed Scale  Current BMI (kg/m2): 30.2                          BMI Categories: Obese Class 1 (BMI 30.0-34.9)    Estimated Daily Nutrient Needs:  Energy Requirements Based On: Formula  Weight Used for Energy Requirements: Current  Energy (kcal/day): PSU 1984 (MSJ 1413)  Weight Used for Protein Requirements: Current  Protein  (g/day): 87-109g (0.8-1gPro/kg)  Method Used for Fluid Requirements: 1 ml/kcal  Fluid (ml/day): 2000mL    Nutrition Diagnosis:   Inadequate protein-energy intake related to impaired respiratory function as evidenced by NPO or clear liquid status due to medical condition    Nutrition Interventions:   Food and/or Nutrient Delivery: Start Tube Feeding  Nutrition Education/Counseling: No recommendation at this time  Coordination of Nutrition Care: Continue to monitor while inpatient, Interdisciplinary Rounds       Goals:     Goals: Initiate nutrition support, by next RD assessment, Tolerate nutrition support at goal rate (with residuals <500mL and lytes WNL)       Nutrition Monitoring and Evaluation:   Behavioral-Environmental Outcomes: None Identified  Food/Nutrient Intake Outcomes: Enteral Nutrition Intake/Tolerance, IVF Intake  Physical Signs/Symptoms Outcomes: Biochemical Data, Nutrition Focused Physical Findings, Skin, Weight, Fluid Status or Edema, Constipation    Discharge Planning:    Too soon to determine     Cayla Deng RD, CNSC  Contact: ext [phone]

## 2024-09-25 NOTE — PROGRESS NOTES
ICU Progress Note        Subjective:    - continues to be on the ventilator.    - on the ventilator. Vascular planning on repeating the CT today.    - on the vent. Had CTA earlier, shows massive pleural effusion LEFT > right with atelectasis.    - Left chest tube placed yesterday. On vent. SAT/SBT this am.    - Extubated yesterday morning but by afternoon unable to clear secretions hence re-intubated.     HPI  87 y/o male PMHx of Aflutter and  HTN presented to ED via EMS with progressive shortness of breath on exertion over past 1.5 weeks. Initial SpO2 81-83 % per EMS, placed on CPAP. + Tachycardia (100-110s), B/P 158/80. CXR w/ widening of the mediastinum concerning for aneurysm or dissection.  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  He was recently started on Eliquis 3 days prior by PCP unclear reason possible A-fib/a flutter?  Vascular surgery was consulted initial plan was for medical management with impulse control,d/w vascular surgery will administer PCC due to high risk of further bleeding. While in ED patient had acute neurochanges/AMS necessitating repeat imaging, CTH/CTA H/N-no flow-limiting stenosis, approximately 65% stenosis of the proximal right ICA.  Vascular surgery at bedside evaluated patient due to concern for further decompensation after discussion with patient and family taken emergently to the OR for emergent TEVAR     Vital Signs:    /84   Pulse 78   Temp 97.7 °F (36.5 °C) (Axillary)   Resp 18   Ht 1.905 m (6' 3\")   Wt 109.7 kg (241 lb 13.5 oz)   SpO2 94%   BMI 30.23 kg/m²             Temp (24hrs), Av °F (36.7 °C), Min:97.6 °F (36.4 °C), Max:98.3 °F (36.8 °C)       Intake/Output:   Last shift:      701 - 1900  In: -   Out: 20 [Urine:20]  Last 3 shifts: 1901 - 700  In: .2 [I.V.:1767.2]  Out: 3585 [Urine:1805]    Intake/Output Summary (Last 24 hours) at 2024 0911  Last data filed at 2024

## 2024-09-25 NOTE — INTERDISCIPLINARY ROUNDS
Critical care interdisciplinary rounds today.  Following members present: Case Management, , Clinical Lead, Diabetes Team, Nursing, Nutrition, Pharmacy, and Physician. Family invited to participate.  Plan of care discussed.  See clinical pathway for plan of care and interventions and desired outcomes.    Pt. Intubated and sedated.     Restraints in use.     CVC and koch placed and in use. Keep both for today per Dr. Castro.

## 2024-09-26 ENCOUNTER — APPOINTMENT (OUTPATIENT)
Facility: HOSPITAL | Age: 89
DRG: 219 | End: 2024-09-26
Payer: MEDICARE

## 2024-09-26 LAB
ANION GAP SERPL CALC-SCNC: 6 MMOL/L (ref 2–12)
BUN SERPL-MCNC: 39 MG/DL (ref 6–20)
BUN/CREAT SERPL: 34 (ref 12–20)
CALCIUM SERPL-MCNC: 7.8 MG/DL (ref 8.5–10.1)
CHLORIDE SERPL-SCNC: 113 MMOL/L (ref 97–108)
CO2 SERPL-SCNC: 25 MMOL/L (ref 21–32)
CREAT SERPL-MCNC: 1.15 MG/DL (ref 0.7–1.3)
ECHO BSA: 2.35 M2
ERYTHROCYTE [DISTWIDTH] IN BLOOD BY AUTOMATED COUNT: 15.9 % (ref 11.5–14.5)
GLUCOSE BLD STRIP.AUTO-MCNC: 160 MG/DL (ref 65–117)
GLUCOSE SERPL-MCNC: 137 MG/DL (ref 65–100)
HCT VFR BLD AUTO: 30.1 % (ref 36.6–50.3)
HGB BLD-MCNC: 9.5 G/DL (ref 12.1–17)
MAGNESIUM SERPL-MCNC: 2.4 MG/DL (ref 1.6–2.4)
MCH RBC QN AUTO: 28.8 PG (ref 26–34)
MCHC RBC AUTO-ENTMCNC: 31.6 G/DL (ref 30–36.5)
MCV RBC AUTO: 91.2 FL (ref 80–99)
NRBC # BLD: 0 K/UL (ref 0–0.01)
NRBC BLD-RTO: 0 PER 100 WBC
PHOSPHATE SERPL-MCNC: 2.7 MG/DL (ref 2.6–4.7)
PLATELET # BLD AUTO: 121 K/UL (ref 150–400)
PMV BLD AUTO: 10.8 FL (ref 8.9–12.9)
POTASSIUM SERPL-SCNC: 3.8 MMOL/L (ref 3.5–5.1)
RBC # BLD AUTO: 3.3 M/UL (ref 4.1–5.7)
SERVICE CMNT-IMP: ABNORMAL
SODIUM SERPL-SCNC: 144 MMOL/L (ref 136–145)
WBC # BLD AUTO: 8.1 K/UL (ref 4.1–11.1)

## 2024-09-26 PROCEDURE — 6360000002 HC RX W HCPCS: Performed by: INTERNAL MEDICINE

## 2024-09-26 PROCEDURE — 83735 ASSAY OF MAGNESIUM: CPT

## 2024-09-26 PROCEDURE — 36415 COLL VENOUS BLD VENIPUNCTURE: CPT

## 2024-09-26 PROCEDURE — 31624 DX BRONCHOSCOPE/LAVAGE: CPT

## 2024-09-26 PROCEDURE — 94640 AIRWAY INHALATION TREATMENT: CPT

## 2024-09-26 PROCEDURE — 2580000003 HC RX 258: Performed by: SURGERY

## 2024-09-26 PROCEDURE — 6360000002 HC RX W HCPCS

## 2024-09-26 PROCEDURE — 2500000003 HC RX 250 WO HCPCS: Performed by: NURSE PRACTITIONER

## 2024-09-26 PROCEDURE — 85027 COMPLETE CBC AUTOMATED: CPT

## 2024-09-26 PROCEDURE — 0B9M8ZZ DRAINAGE OF BILATERAL LUNGS, VIA NATURAL OR ARTIFICIAL OPENING ENDOSCOPIC: ICD-10-PCS | Performed by: INTERNAL MEDICINE

## 2024-09-26 PROCEDURE — 80048 BASIC METABOLIC PNL TOTAL CA: CPT

## 2024-09-26 PROCEDURE — 0WPBX0Z REMOVAL OF DRAINAGE DEVICE FROM LEFT PLEURAL CAVITY, EXTERNAL APPROACH: ICD-10-PCS | Performed by: INTERNAL MEDICINE

## 2024-09-26 PROCEDURE — 71045 X-RAY EXAM CHEST 1 VIEW: CPT

## 2024-09-26 PROCEDURE — 84100 ASSAY OF PHOSPHORUS: CPT

## 2024-09-26 PROCEDURE — 6360000002 HC RX W HCPCS: Performed by: NURSE PRACTITIONER

## 2024-09-26 PROCEDURE — 82962 GLUCOSE BLOOD TEST: CPT

## 2024-09-26 PROCEDURE — 2000000000 HC ICU R&B

## 2024-09-26 PROCEDURE — 6370000000 HC RX 637 (ALT 250 FOR IP): Performed by: NURSE PRACTITIONER

## 2024-09-26 PROCEDURE — 94003 VENT MGMT INPAT SUBQ DAY: CPT

## 2024-09-26 PROCEDURE — 6370000000 HC RX 637 (ALT 250 FOR IP): Performed by: INTERNAL MEDICINE

## 2024-09-26 RX ORDER — FENTANYL CITRATE 50 UG/ML
25 INJECTION, SOLUTION INTRAMUSCULAR; INTRAVENOUS
Status: DISCONTINUED | OUTPATIENT
Start: 2024-09-26 | End: 2024-09-26

## 2024-09-26 RX ORDER — FUROSEMIDE 10 MG/ML
20 INJECTION INTRAMUSCULAR; INTRAVENOUS ONCE
Status: COMPLETED | OUTPATIENT
Start: 2024-09-26 | End: 2024-09-26

## 2024-09-26 RX ORDER — MIDAZOLAM HYDROCHLORIDE 2 MG/2ML
2 INJECTION, SOLUTION INTRAMUSCULAR; INTRAVENOUS ONCE
Status: COMPLETED | OUTPATIENT
Start: 2024-09-26 | End: 2024-09-26

## 2024-09-26 RX ORDER — MIDAZOLAM HYDROCHLORIDE 1 MG/ML
INJECTION INTRAMUSCULAR; INTRAVENOUS
Status: COMPLETED
Start: 2024-09-26 | End: 2024-09-26

## 2024-09-26 RX ORDER — FENTANYL CITRATE 50 UG/ML
50 INJECTION, SOLUTION INTRAMUSCULAR; INTRAVENOUS
Status: DISCONTINUED | OUTPATIENT
Start: 2024-09-26 | End: 2024-09-28 | Stop reason: HOSPADM

## 2024-09-26 RX ADMIN — ARFORMOTEROL TARTRATE: 15 SOLUTION RESPIRATORY (INHALATION) at 18:18

## 2024-09-26 RX ADMIN — ENOXAPARIN SODIUM 30 MG: 100 INJECTION SUBCUTANEOUS at 22:37

## 2024-09-26 RX ADMIN — DEXMEDETOMIDINE HYDROCHLORIDE 0.4 MCG/KG/HR: 400 INJECTION, SOLUTION INTRAVENOUS at 06:32

## 2024-09-26 RX ADMIN — POLYETHYLENE GLYCOL 3350 17 G: 17 POWDER, FOR SOLUTION ORAL at 09:31

## 2024-09-26 RX ADMIN — FUROSEMIDE 20 MG: 10 INJECTION, SOLUTION INTRAMUSCULAR; INTRAVENOUS at 22:37

## 2024-09-26 RX ADMIN — SODIUM CHLORIDE, PRESERVATIVE FREE 10 ML: 5 INJECTION INTRAVENOUS at 22:37

## 2024-09-26 RX ADMIN — CHLORHEXIDINE GLUCONATE 15 ML: 1.2 RINSE ORAL at 22:37

## 2024-09-26 RX ADMIN — Medication 1 AMPULE: at 22:37

## 2024-09-26 RX ADMIN — SODIUM CHLORIDE, PRESERVATIVE FREE 10 ML: 5 INJECTION INTRAVENOUS at 09:00

## 2024-09-26 RX ADMIN — GUAIFENESIN 400 MG: 200 SOLUTION ORAL at 00:14

## 2024-09-26 RX ADMIN — CHLORHEXIDINE GLUCONATE 15 ML: 1.2 RINSE ORAL at 09:31

## 2024-09-26 RX ADMIN — MIDAZOLAM HYDROCHLORIDE 2 MG: 2 INJECTION, SOLUTION INTRAMUSCULAR; INTRAVENOUS at 21:54

## 2024-09-26 RX ADMIN — GUAIFENESIN 400 MG: 200 SOLUTION ORAL at 06:31

## 2024-09-26 RX ADMIN — ENOXAPARIN SODIUM 30 MG: 100 INJECTION SUBCUTANEOUS at 09:33

## 2024-09-26 RX ADMIN — Medication 1 AMPULE: at 08:59

## 2024-09-26 RX ADMIN — Medication: at 22:37

## 2024-09-26 RX ADMIN — FENTANYL CITRATE 50 MCG: 50 INJECTION INTRAMUSCULAR; INTRAVENOUS at 22:01

## 2024-09-26 RX ADMIN — Medication: at 08:59

## 2024-09-26 RX ADMIN — MIDAZOLAM HYDROCHLORIDE 2 MG: 1 INJECTION, SOLUTION INTRAMUSCULAR; INTRAVENOUS at 21:54

## 2024-09-26 RX ADMIN — DEXMEDETOMIDINE HYDROCHLORIDE 0.4 MCG/KG/HR: 400 INJECTION, SOLUTION INTRAVENOUS at 15:31

## 2024-09-26 ASSESSMENT — PULMONARY FUNCTION TESTS
PIF_VALUE: 27
PIF_VALUE: 25
PIF_VALUE: 24
PIF_VALUE: 25
PIF_VALUE: 26
PIF_VALUE: 26
PIF_VALUE: 25

## 2024-09-26 ASSESSMENT — PAIN SCALES - GENERAL
PAINLEVEL_OUTOF10: 0

## 2024-09-26 NOTE — PROCEDURES
PROCEDURE NOTE  Date: 9/26/2024   Name: Micheal Ohara  YOB: 1935    Procedures    Bronchoscopy:    INDICATION: isolated L sided monophonic inspiratory wheeze. R/O endobronchial lesion    PROCEDURE:  Informed consent obtained from wife over phone. 20 cc 1% lidocaine instilled via ETT prior to procedure. The bronchoscope was inserted into ETT and advanced into the trachea. An airway exam of both lungs was performed. The airways were generally very beefy red. There was mild mucoid secretions throughout which were easily suctioned and removed. There was no endobronchial masses, tumors or obstruction. The bronchoscope was removed. No specimens were obtained    Ruben Mcmillan MD  [facility]  [facility] 4th floor [facility] phone: [phone]  [facility] 7th floor [facility] phone: [phone]  [facility] phone: [phone]  9/26/2024

## 2024-09-26 NOTE — PROGRESS NOTES
Electronically signed by Francisco Walls      CTA HEAD NECK W CONTRAST   Final Result   CTA Head:   1. No evidence of flow-limiting stenosis or aneurysm. Scattered atherosclerotic   disease as above.      CTA Neck:   1. No evidence of flow-limiting stenosis.   2. Approximately 65% stenosis of the proximal right internal carotid artery by   NASCET criteria.   3. Partially visualized ruptured thoracic aortic aneurysm with intramural   hematoma. Partially visualized bilateral pleural effusions, left worse than   right.      CT Brain Perfusion:   1. Nondiagnostic.         Electronically signed by Francisco Walls      CT BRAIN PERFUSION   Final Result   CTA Head:   1. No evidence of flow-limiting stenosis or aneurysm. Scattered atherosclerotic   disease as above.      CTA Neck:   1. No evidence of flow-limiting stenosis.   2. Approximately 65% stenosis of the proximal right internal carotid artery by   NASCET criteria.   3. Partially visualized ruptured thoracic aortic aneurysm with intramural   hematoma. Partially visualized bilateral pleural effusions, left worse than   right.      CT Brain Perfusion:   1. Nondiagnostic.         Electronically signed by Francisco Walls      CT HEAD WO CONTRAST   Final Result   No acute abnormality.            Electronically signed by MAGDALENA GRAY      CTA CHEST ABDOMEN PELVIS W CONTRAST   Final Result      Aortic findings as detailed above concerning for rupture of penetrating   atherosclerotic ulcer versus dissection tracking along the aorta and great   vessels with concerns of communication to the left pleural space. Surgical   consultation is advised.      Right intermediate attenuating pleural fluid is indeterminate.      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      Aneurysms and vascular irregularities of the bilateral common iliac, bilateral   internal iliac, and right renal artery.      Left lower lobe groundglass and solid opacities  indeterminate for atelectasis   versus infectious/inflammatory process. Clinical correlation is advised.      Cardiomegaly with right atrial enlargement.      Age-indeterminate compression deformity of the T12 vertebral body with severe   height loss. Correlation with point tenderness is advised.      Other incidental findings as above.      --Communication   Preliminary critical findings were phoned to Bruno Quinonez MD  by Dr. Mitesh Muller at 9/20/2024 7:01 PM.   --         Electronically signed by Mitesh Muller      XR CHEST PORTABLE   Final Result   Widening of the mediastinum concerning for aneurysm or dissection. CTA chest is   recommended. There is a small left-sided pleural effusion.      The findings were called to Dr. Quinonez on  9/20/2024 at 1808 hours EST by Dr. Willis.      789               Electronically signed by BROCK LOPEZ XR TECHNOLOGIST SERVICE    (Results Pending)       ASSESSMENT:  Advanced age with hx of paroxysmal flutter, Htn  S/p TEVAR from L CCA w/ 2 stent grafts - 09/21   Postop mechanical ventilation/Respiratory insufficiency  Failed extubation 09/25  L hemothorax - S/P chest tube placement 09/23. Chest tube removed 09/26  Severe bronchitis (based on FOB findings 09/26)  Suspect pulmonary edema  Acute kidney injury, resolved  Mild thrombocytopenia - resolving  ICU/vent associated discomfort    PLAN:  Vent settings reviewed with RT  Daily SBT  Need to clarify re-intubation status prior to extubation attempt  Empiric nebulized ICS/LABA initiated   Cardiac/hemodynamic monitoring  MAP goal > 65 mmHg  SBP goal < 160 mmHg  Check BNP in AM 09/27. Consider TTE  Monitor I/Os and Uo  Monitor renal function panel intermittently  Correct electrolytes as needed  Cont nutritional support  Cont glycemic control  Monitor for signs/sx of infection  Follow CBC  Transfuse as needed per usual ICU guidelines  Cont LMWH for DVT ppx  Minimize sedating meds  Dexmedetomidine infusion  PRN

## 2024-09-26 NOTE — PROCEDURES
PROCEDURE NOTE  Date: 9/26/2024   Name: Micheal Ohara  YOB: 1935    Chest Tube Removal    Date/Time: 9/26/2024 3:23 PM    Performed by: Ruben Mcmillan MD  Authorized by: Ruben Mcmillan MD  Patient tolerance: patient tolerated the procedure well with no immediate complications

## 2024-09-26 NOTE — PLAN OF CARE
Problem: Safety - Medical Restraint  Goal: Remains free of injury from restraints (Restraint for Interference with Medical Device)  Description: INTERVENTIONS:  1. Determine that other, less restrictive measures have been tried or would not be effective before applying the restraint  2. Evaluate the patient's condition at the time of restraint application  3. Inform patient/family regarding the reason for restraint  4. Q2H: Monitor safety, psychosocial status, comfort, nutrition and hydration  Outcome: Progressing  Flowsheets (Taken 9/26/2024 0800)  Remains free of injury from restraints (restraint for interference with medical device):   Determine that other, less restrictive measures have been tried or would not be effective before applying the restraint   Evaluate the patient's condition at the time of restraint application   Inform patient/family regarding the reason for restraint   Every 2 hours: Monitor safety, psychosocial status, comfort, nutrition and hydration

## 2024-09-26 NOTE — PROGRESS NOTES
Pt remains intubated and sedated with inability to be weaned.  Noted bronch today  Will defer OT at this time, but sign off if pt remains medically inappropriate for assessment/treatment.

## 2024-09-26 NOTE — CARE COORDINATION
Transition of Care Plan:    RUR: 16%  Prior Level of Functioning: Independent  Disposition: Family awaiting to see how patient progresses.   If SNF or IPR: Date FOC offered: N/A  Date FOC received: N/A  Accepting facility: N/A  Date authorization started with reference number: N/A  Date authorization received and expires: N/A  Follow up appointments: To be done prior to discharge  DME needed: None  Transportation at discharge: Family vs BLS  IM/IMM Medicare/ letter given: To be given prior to discharge.    Is patient a  and connected with VA? No   If yes, was  transfer form completed and VA notified? N/A  Caregiver Contact: Wife  Discharge Caregiver contacted prior to discharge? Caregiver to be contacted prior to discharge.   Care Conference needed? No  Barriers to discharge:  Medical stability        Patient discussed in IDR'S. No family at bedside. Remains intubated. For bronch today.        Mague Downs RN BSN CRM        [phone]

## 2024-09-26 NOTE — PROGRESS NOTES
09/26/24 0625   B: Both Spontaneous Awakening and Breathing Trials   Spontaneous Awakening Trial (SAT) Outcome SAT passed   Was Patient Receiving Mechanical Ventilation Yes   Safety Screening Spontaneous Breathing Trial (SBT) Proceed with SBT - no exclusion criteria met   RT Notified Ready for SBT Yes   Spontaneous VT 0 mL   Spontaneous Breathing Trial (SBT) Outcome Respiratory rate less than 8/min for 5 min or more - SBT failure   Patient Meet Extubation Criteria No   Reasons Failed Extubation Criteria Respiratory failure pathology not resolved or improving   Provider Ordered Extubation No   Weaning Parameters   Spontaneous Breathing Trial Complete Yes   Respiratory Rate Observed 0   Ve 0   VT 0     Patient went apneic immediately no breath effort

## 2024-09-26 NOTE — PROGRESS NOTES
Attended Interdisciplinary rounds in CCU; patient care was discussed.    CHEIKH Moore, BCC, Staff [facility] Paging Service  [phone])

## 2024-09-26 NOTE — PROGRESS NOTES
0700 Bedside and Verbal shift change report given to Tiff RN (oncoming nurse) by Jaron RN (offgoing nurse). Report included the following information Nurse Handoff Report, Index, Adult Overview, Surgery Report, Intake/Output, MAR, Recent Results, and Cardiac Rhythm a fib .      0800 Shift assessment completed - see flowsheets     0920 MD Hipolito at bedside assessing patient. MD made aware of left side inspiratory wheezing. MD to put in orders for bronchoscopy consent and chest XR    1000 LR stopped     1155 MD Hipolito at bedside. Per MD, connect chest tube to suction and will reassess CT removal this afternoon    1200 Reassessment completed. TF advanced to 45 mL/hr and 200 mL flush changed to q 6 per order. Patient bathed and linen changed - see flowsheets     1315 CT removed - Vaseline gauze, 4x4, and pressure tape applied by Hipolito WRIGHT    1459 Time out completed prior to bronchoscopy - see flowsheets     0749-4667 Bronchoscopy preformed at bedside with this RN, Hipolito WRIGHT, and Mary RT     1600 Reassessment completed - see flowsheets     1800 TF advanced to 55 mL/hr per protocol and TF set and tubing changed - see flowsheets     Bedside and Verbal shift change report given to Jaron RN (oncoming nurse) by Tiff RN (offgoing nurse). Report included the following information Nurse Handoff Report, Index, Adult Overview, Intake/Output, MAR, Recent Results, and Cardiac Rhythm a fib .

## 2024-09-26 NOTE — PROGRESS NOTES
Critical care interdisciplinary rounds today.  Following members present: Case Management, , Clinical Lead, Diabetes Team, Nursing, Nutrition, Pharmacy, and Physician. Family invited to participate.  Plan of care discussed.  See clinical pathway for plan of care and interventions and desired outcomes.     Schultz for difficult insertion by urology.  Central line remains for access.  For bronch today.

## 2024-09-27 ENCOUNTER — APPOINTMENT (OUTPATIENT)
Facility: HOSPITAL | Age: 89
DRG: 219 | End: 2024-09-27
Payer: MEDICARE

## 2024-09-27 PROBLEM — J90 PLEURAL EFFUSION: Status: ACTIVE | Noted: 2024-09-27

## 2024-09-27 PROBLEM — J96.01 ACUTE RESPIRATORY FAILURE WITH HYPOXIA: Status: ACTIVE | Noted: 2024-09-27

## 2024-09-27 PROBLEM — Z71.89 GOALS OF CARE, COUNSELING/DISCUSSION: Status: ACTIVE | Noted: 2024-09-27

## 2024-09-27 PROBLEM — J40 BRONCHITIS: Status: ACTIVE | Noted: 2024-09-27

## 2024-09-27 PROBLEM — J94.2 HEMOTHORAX ON LEFT: Status: ACTIVE | Noted: 2024-09-27

## 2024-09-27 LAB
ANION GAP SERPL CALC-SCNC: 6 MMOL/L (ref 2–12)
BUN SERPL-MCNC: 36 MG/DL (ref 6–20)
BUN/CREAT SERPL: 29 (ref 12–20)
CALCIUM SERPL-MCNC: 7.7 MG/DL (ref 8.5–10.1)
CHLORIDE SERPL-SCNC: 113 MMOL/L (ref 97–108)
CO2 SERPL-SCNC: 27 MMOL/L (ref 21–32)
CREAT SERPL-MCNC: 1.26 MG/DL (ref 0.7–1.3)
EKG DIAGNOSIS: NORMAL
EKG Q-T INTERVAL: 390 MS
EKG QRS DURATION: 112 MS
EKG QTC CALCULATION (BAZETT): 417 MS
EKG R AXIS: -18 DEGREES
EKG T AXIS: -52 DEGREES
EKG VENTRICULAR RATE: 69 BPM
ERYTHROCYTE [DISTWIDTH] IN BLOOD BY AUTOMATED COUNT: 15.9 % (ref 11.5–14.5)
GLUCOSE BLD STRIP.AUTO-MCNC: 162 MG/DL (ref 65–117)
GLUCOSE BLD STRIP.AUTO-MCNC: 191 MG/DL (ref 65–117)
GLUCOSE SERPL-MCNC: 197 MG/DL (ref 65–100)
HCT VFR BLD AUTO: 29.5 % (ref 36.6–50.3)
HGB BLD-MCNC: 9.4 G/DL (ref 12.1–17)
MAGNESIUM SERPL-MCNC: 2.3 MG/DL (ref 1.6–2.4)
MCH RBC QN AUTO: 29.1 PG (ref 26–34)
MCHC RBC AUTO-ENTMCNC: 31.9 G/DL (ref 30–36.5)
MCV RBC AUTO: 91.3 FL (ref 80–99)
NRBC # BLD: 0 K/UL (ref 0–0.01)
NRBC BLD-RTO: 0 PER 100 WBC
NT PRO BNP: 2959 PG/ML
PHOSPHATE SERPL-MCNC: 3.1 MG/DL (ref 2.6–4.7)
PLATELET # BLD AUTO: 141 K/UL (ref 150–400)
PMV BLD AUTO: 10.8 FL (ref 8.9–12.9)
POTASSIUM SERPL-SCNC: 4.1 MMOL/L (ref 3.5–5.1)
PROCALCITONIN SERPL-MCNC: 0.11 NG/ML
RBC # BLD AUTO: 3.23 M/UL (ref 4.1–5.7)
SERVICE CMNT-IMP: ABNORMAL
SERVICE CMNT-IMP: ABNORMAL
SODIUM SERPL-SCNC: 146 MMOL/L (ref 136–145)
WBC # BLD AUTO: 10.2 K/UL (ref 4.1–11.1)

## 2024-09-27 PROCEDURE — 80048 BASIC METABOLIC PNL TOTAL CA: CPT

## 2024-09-27 PROCEDURE — 6370000000 HC RX 637 (ALT 250 FOR IP): Performed by: INTERNAL MEDICINE

## 2024-09-27 PROCEDURE — 6370000000 HC RX 637 (ALT 250 FOR IP): Performed by: NURSE PRACTITIONER

## 2024-09-27 PROCEDURE — 6360000002 HC RX W HCPCS: Performed by: INTERNAL MEDICINE

## 2024-09-27 PROCEDURE — 82962 GLUCOSE BLOOD TEST: CPT

## 2024-09-27 PROCEDURE — 83880 ASSAY OF NATRIURETIC PEPTIDE: CPT

## 2024-09-27 PROCEDURE — 2580000003 HC RX 258: Performed by: INTERNAL MEDICINE

## 2024-09-27 PROCEDURE — 2500000003 HC RX 250 WO HCPCS: Performed by: NURSE PRACTITIONER

## 2024-09-27 PROCEDURE — 84100 ASSAY OF PHOSPHORUS: CPT

## 2024-09-27 PROCEDURE — 94640 AIRWAY INHALATION TREATMENT: CPT

## 2024-09-27 PROCEDURE — 2000000000 HC ICU R&B

## 2024-09-27 PROCEDURE — 99222 1ST HOSP IP/OBS MODERATE 55: CPT | Performed by: NURSE PRACTITIONER

## 2024-09-27 PROCEDURE — 83735 ASSAY OF MAGNESIUM: CPT

## 2024-09-27 PROCEDURE — 36415 COLL VENOUS BLD VENIPUNCTURE: CPT

## 2024-09-27 PROCEDURE — 84145 PROCALCITONIN (PCT): CPT

## 2024-09-27 PROCEDURE — 85027 COMPLETE CBC AUTOMATED: CPT

## 2024-09-27 PROCEDURE — 6360000002 HC RX W HCPCS: Performed by: NURSE PRACTITIONER

## 2024-09-27 PROCEDURE — 93005 ELECTROCARDIOGRAM TRACING: CPT | Performed by: INTERNAL MEDICINE

## 2024-09-27 PROCEDURE — 94003 VENT MGMT INPAT SUBQ DAY: CPT

## 2024-09-27 PROCEDURE — 2580000003 HC RX 258: Performed by: SURGERY

## 2024-09-27 PROCEDURE — 71045 X-RAY EXAM CHEST 1 VIEW: CPT

## 2024-09-27 PROCEDURE — 5A1935Z RESPIRATORY VENTILATION, LESS THAN 24 CONSECUTIVE HOURS: ICD-10-PCS | Performed by: INTERNAL MEDICINE

## 2024-09-27 RX ORDER — DEXTROSE MONOHYDRATE 50 MG/ML
INJECTION, SOLUTION INTRAVENOUS CONTINUOUS
Status: DISCONTINUED | OUTPATIENT
Start: 2024-09-27 | End: 2024-09-28

## 2024-09-27 RX ORDER — BISACODYL 10 MG
10 SUPPOSITORY, RECTAL RECTAL ONCE
Status: COMPLETED | OUTPATIENT
Start: 2024-09-27 | End: 2024-09-27

## 2024-09-27 RX ORDER — SODIUM CHLORIDE, SODIUM LACTATE, POTASSIUM CHLORIDE, AND CALCIUM CHLORIDE .6; .31; .03; .02 G/100ML; G/100ML; G/100ML; G/100ML
500 INJECTION, SOLUTION INTRAVENOUS ONCE
Status: COMPLETED | OUTPATIENT
Start: 2024-09-27 | End: 2024-09-27

## 2024-09-27 RX ADMIN — ENOXAPARIN SODIUM 30 MG: 100 INJECTION SUBCUTANEOUS at 09:02

## 2024-09-27 RX ADMIN — BISACODYL 10 MG: 10 SUPPOSITORY RECTAL at 11:15

## 2024-09-27 RX ADMIN — DEXMEDETOMIDINE HYDROCHLORIDE 0.4 MCG/KG/HR: 400 INJECTION, SOLUTION INTRAVENOUS at 02:10

## 2024-09-27 RX ADMIN — Medication 1 AMPULE: at 09:02

## 2024-09-27 RX ADMIN — SODIUM CHLORIDE, POTASSIUM CHLORIDE, SODIUM LACTATE AND CALCIUM CHLORIDE 500 ML: 600; 310; 30; 20 INJECTION, SOLUTION INTRAVENOUS at 08:05

## 2024-09-27 RX ADMIN — DEXMEDETOMIDINE HYDROCHLORIDE 0.4 MCG/KG/HR: 400 INJECTION, SOLUTION INTRAVENOUS at 09:43

## 2024-09-27 RX ADMIN — Medication: at 08:20

## 2024-09-27 RX ADMIN — ARFORMOTEROL TARTRATE: 15 SOLUTION RESPIRATORY (INHALATION) at 19:52

## 2024-09-27 RX ADMIN — CHLORHEXIDINE GLUCONATE 15 ML: 1.2 RINSE ORAL at 09:02

## 2024-09-27 RX ADMIN — SODIUM CHLORIDE, PRESERVATIVE FREE 10 ML: 5 INJECTION INTRAVENOUS at 19:54

## 2024-09-27 RX ADMIN — ARFORMOTEROL TARTRATE: 15 SOLUTION RESPIRATORY (INHALATION) at 08:58

## 2024-09-27 RX ADMIN — Medication: at 20:03

## 2024-09-27 RX ADMIN — FENTANYL CITRATE 50 MCG: 50 INJECTION INTRAMUSCULAR; INTRAVENOUS at 03:43

## 2024-09-27 RX ADMIN — Medication 1 AMPULE: at 20:00

## 2024-09-27 RX ADMIN — ENOXAPARIN SODIUM 30 MG: 100 INJECTION SUBCUTANEOUS at 20:39

## 2024-09-27 RX ADMIN — SODIUM CHLORIDE, PRESERVATIVE FREE 10 ML: 5 INJECTION INTRAVENOUS at 09:58

## 2024-09-27 RX ADMIN — DEXTROSE MONOHYDRATE: 50 INJECTION, SOLUTION INTRAVENOUS at 09:12

## 2024-09-27 RX ADMIN — DEXMEDETOMIDINE HYDROCHLORIDE 0.4 MCG/KG/HR: 400 INJECTION, SOLUTION INTRAVENOUS at 16:52

## 2024-09-27 RX ADMIN — CHLORHEXIDINE GLUCONATE 15 ML: 1.2 RINSE ORAL at 19:55

## 2024-09-27 RX ADMIN — POLYETHYLENE GLYCOL 3350 17 G: 17 POWDER, FOR SOLUTION ORAL at 09:02

## 2024-09-27 ASSESSMENT — PAIN SCALES - GENERAL
PAINLEVEL_OUTOF10: 0

## 2024-09-27 ASSESSMENT — PULMONARY FUNCTION TESTS
PIF_VALUE: 21
PIF_VALUE: 30

## 2024-09-27 NOTE — PROGRESS NOTES
OT note;    Pt remains intubated and medically fragile. Will sign off. Can be re-consulted.

## 2024-09-27 NOTE — PROGRESS NOTES
Comprehensive Nutrition Assessment    Type and Reason for Visit:  Reassess    Nutrition Recommendations/Plan:   Continue TF as ordered  Add suppository today, no BM since admit (6 days ago)  Continue daily glycolax  Consider enema over the weekend if no results with suppository     Malnutrition Assessment:  Malnutrition Status:  Insufficient data (09/25/24 1408)        Nutrition Assessment:  Chart reviewed, case discussed during CCU rounds.  Pt remains intubated, needs re-estimated.  TF at goal rate with residuals 5-60mL.  His abdomen is distended, still no BM this admission despite glycolax added Wednesday.  Discussed need for increased bowel regimen during IDR's, will add suppository today.  Na 146 this morning (flushes decreased by Intensivist yesterday) so D5 started.  Lytes otherwise WNL.  TF meets 100% kcal and protein needs.  No family in the room to speak with at time of visit, Palliative consulted to discuss GOC.        Nutrition Related Findings:    Meds: glycolax, dulcolax suppository, D5W@50mL/h;   Drips: precedex.    Edema: +1-generalized, +3-BUE.    BM: PTA   Wound Type: Surgical Incision       Current Nutrition Intake & Therapies:    Average Meal Intake: NPO     Current Tube Feeding (TF) Orders:  Feeding Route:  NGT  Formula:  Jevity 1.5  Schedule:  continuous  Feeding Regimen:  55mL/h  Additives/Modulars:  Prosource daily  Water Flushes:  200mL q 6h  Current TF & Flush Orders Provides:  2060kcals/104gPro/284gCHO/1803mL  Goal TF & Flush Orders Provides:  2060kcals/104gPro/284gCHO/1803mL    Anthropometric Measures:  Height: 190.5 cm (6' 3\")  Ideal Body Weight (IBW): 196 lbs (89 kg)       Current Body Weight: 109.7 kg (241 lb 13.5 oz), 123.4 % IBW. Weight Source: Bed Scale  Current BMI (kg/m2): 30.2                          BMI Categories: Obese Class 1 (BMI 30.0-34.9)    Estimated Daily Nutrient Needs:  Energy Requirements Based On: Formula  Weight Used for Energy Requirements: Current  Energy

## 2024-09-27 NOTE — CONSULTS
Palliative Medicine  Patient Name: Micheal Ohara  YOB: 1935  MRN: [id number]  Age: 88 y.o.  Gender: male  Family not coming until late tonight  Plan for broch today  Will meet with family tomorrow    EDEL Skinner NP

## 2024-09-27 NOTE — PROGRESS NOTES
opacities indeterminate for atelectasis   versus infectious/inflammatory process. Clinical correlation is advised.      Cardiomegaly with right atrial enlargement.      Age-indeterminate compression deformity of the T12 vertebral body with severe   height loss. Correlation with point tenderness is advised.      Other incidental findings as above.      --Communication   Preliminary critical findings were phoned to Bruno Quinonez MD  by Dr. Mitesh Muller at 9/20/2024 7:01 PM.   --         Electronically signed by Mitesh Muller      XR CHEST PORTABLE   Final Result   Widening of the mediastinum concerning for aneurysm or dissection. CTA chest is   recommended. There is a small left-sided pleural effusion.      The findings were called to Dr. Quinonez on  9/20/2024 at 1808 hours EST by Dr. Willis.      789               Electronically signed by BROCK LOPEZ XR TECHNOLOGIST SERVICE    (Results Pending)       ASSESSMENT:  Advanced age with hx of paroxysmal flutter, Htn  S/p TEVAR from L CCA w/ 2 stent grafts - 09/21   Postop mechanical ventilation/Respiratory insufficiency  Failed extubation 09/25  L hemothorax - S/P chest tube placement 09/23. Chest tube removed 09/26  Severe bronchitis (based on FOB findings 09/26)  AF with controlled rate  Elevated BNP  R pleural effusion and suspected pulmonary edema   Low BP precludes diuresis presently  Acute kidney injury, resolved  Mild thrombocytopenia - resolving  Purulent bronchitis  ICU/vent associated discomfort    PLAN:  Vent settings reviewed with RT  Daily SBT  One way extubation once it is felt that his respiratory status is optimized  Empiric nebulized ICS/LABA initiated 09/27  Cardiac/hemodynamic monitoring  MAP goal > 65 mmHg  SBP goal < 160 mmHg  TTE ordered  Monitor I/Os and Uo  Monitor renal function panel intermittently  Correct electrolytes as needed  Cont nutritional support  Cont glycemic control  Initiate ceftriaxone empirically 09/27  Follow  CBC  Transfuse as needed per usual ICU guidelines  Cont LMWH for DVT ppx  Minimize sedating meds  Cont dexmedetomidine infusion  Cont PRN fentanyl  Palliative Care following  Full code - this needs to be further clarified and should be changed to DNR after extubation    Daughter and wife updated in detail at bedside 09/27      Critical Care Time: 40 minutes    Ruben Mcmillan MD  [facility]  [facility] 4th floor [facility] phone: [phone]  [facility] 7th floor [facility] phone: [phone]  [facility] phone: [phone]  9/27/2024

## 2024-09-27 NOTE — PLAN OF CARE
Problem: Safety - Medical Restraint  Goal: Remains free of injury from restraints (Restraint for Interference with Medical Device)  Description: INTERVENTIONS:  1. Determine that other, less restrictive measures have been tried or would not be effective before applying the restraint  2. Evaluate the patient's condition at the time of restraint application  3. Inform patient/family regarding the reason for restraint  4. Q2H: Monitor safety, psychosocial status, comfort, nutrition and hydration  Outcome: Progressing  Flowsheets (Taken 9/27/2024 1040 by Liliya Aviles RN)  Remains free of injury from restraints (restraint for interference with medical device):   Determine that other, less restrictive measures have been tried or would not be effective before applying the restraint   Evaluate the patient's condition at the time of restraint application   Inform patient/family regarding the reason for restraint   Every 2 hours: Monitor safety, psychosocial status, comfort, nutrition and hydration

## 2024-09-27 NOTE — INTERDISCIPLINARY ROUNDS
Critical care interdisciplinary rounds today.  Following members present: Case Management,  Nursing, Nutrition, Pharmacy, and Physician.   Central line in use with little access. Schultz in per Urology.  Plan of care discussed.  See clinical pathway for plan of care and interventions and desired outcomes.

## 2024-09-27 NOTE — PROGRESS NOTES
09/27/24 0709   B: Both Spontaneous Awakening and Breathing Trials   Did Patient Receive Sedative and/or Opioid IV Medications in the Last 24 Hours Continuously infused meds for sedation   Was Patient Receiving Mechanical Ventilation Yes     No sbt peep 10  60% o2

## 2024-09-27 NOTE — PROGRESS NOTES
0700  Report received from Jaron BARAJAS    0752  BP very soft spoke with Dr Lopez who came to see patient Peep turned down to 6 order for  cc bolus and pro calcitonin  to be done     0800  Assessment complete Patient is intubated on Precedex at 0.4 mcg he does follow commands opens eyes moves all ext's  Restraints in place both groin sites c/d/I lungs diminished all ext's with edema  koch in place with pillo urine  right IJ in place  #20 right AC and #18 right FA both flushed and capped  NGT at 60 cm in report was told at 70 cm repositioned and re taped     0818  Dr Mcmillan in room changed peep to 5 and rate 15  EKG done showing A-fib  D5 at 50ml/hr    1000  lab sent per order    1200  Assessment complete no change  BP soft Dr Mcmillan is aware    1310  IJ removed per orders    1325  Family at bedside    1500  Family left for day    1600  Assessment complete no change remains on 0.4 mcg precedex and D5 at 50 ml/hr follows commands    1630  Medium loose brown stool cleaned    1843 small loose brown stool cleaned and repositioned for comfort    1900  Report to Du BARAJAS

## 2024-09-28 VITALS
OXYGEN SATURATION: 97 % | SYSTOLIC BLOOD PRESSURE: 105 MMHG | DIASTOLIC BLOOD PRESSURE: 58 MMHG | BODY MASS INDEX: 30.07 KG/M2 | HEART RATE: 100 BPM | RESPIRATION RATE: 19 BRPM | TEMPERATURE: 98.4 F | HEIGHT: 75 IN | WEIGHT: 241.84 LBS

## 2024-09-28 LAB
ALBUMIN SERPL-MCNC: 1.7 G/DL (ref 3.5–5)
ALBUMIN/GLOB SERPL: 0.5 (ref 1.1–2.2)
ALP SERPL-CCNC: 78 U/L (ref 45–117)
ALT SERPL-CCNC: 22 U/L (ref 12–78)
ANION GAP SERPL CALC-SCNC: 4 MMOL/L (ref 2–12)
AST SERPL-CCNC: 29 U/L (ref 15–37)
BILIRUB SERPL-MCNC: 1 MG/DL (ref 0.2–1)
BUN SERPL-MCNC: 33 MG/DL (ref 6–20)
BUN/CREAT SERPL: 29 (ref 12–20)
CALCIUM SERPL-MCNC: 7.9 MG/DL (ref 8.5–10.1)
CHLORIDE SERPL-SCNC: 112 MMOL/L (ref 97–108)
CO2 SERPL-SCNC: 27 MMOL/L (ref 21–32)
CREAT SERPL-MCNC: 1.15 MG/DL (ref 0.7–1.3)
ERYTHROCYTE [DISTWIDTH] IN BLOOD BY AUTOMATED COUNT: 16.1 % (ref 11.5–14.5)
GLOBULIN SER CALC-MCNC: 3.2 G/DL (ref 2–4)
GLUCOSE BLD STRIP.AUTO-MCNC: 132 MG/DL (ref 65–117)
GLUCOSE BLD STRIP.AUTO-MCNC: 171 MG/DL (ref 65–117)
GLUCOSE SERPL-MCNC: 151 MG/DL (ref 65–100)
HCT VFR BLD AUTO: 28.4 % (ref 36.6–50.3)
HGB BLD-MCNC: 8.9 G/DL (ref 12.1–17)
MAGNESIUM SERPL-MCNC: 2.2 MG/DL (ref 1.6–2.4)
MCH RBC QN AUTO: 29.4 PG (ref 26–34)
MCHC RBC AUTO-ENTMCNC: 31.3 G/DL (ref 30–36.5)
MCV RBC AUTO: 93.7 FL (ref 80–99)
NRBC # BLD: 0 K/UL (ref 0–0.01)
NRBC BLD-RTO: 0 PER 100 WBC
PLATELET # BLD AUTO: 145 K/UL (ref 150–400)
PMV BLD AUTO: 10.9 FL (ref 8.9–12.9)
POTASSIUM SERPL-SCNC: 4 MMOL/L (ref 3.5–5.1)
PROT SERPL-MCNC: 4.9 G/DL (ref 6.4–8.2)
RBC # BLD AUTO: 3.03 M/UL (ref 4.1–5.7)
SERVICE CMNT-IMP: ABNORMAL
SERVICE CMNT-IMP: ABNORMAL
SODIUM SERPL-SCNC: 143 MMOL/L (ref 136–145)
WBC # BLD AUTO: 9.5 K/UL (ref 4.1–11.1)

## 2024-09-28 PROCEDURE — 83735 ASSAY OF MAGNESIUM: CPT

## 2024-09-28 PROCEDURE — 6360000002 HC RX W HCPCS: Performed by: NURSE PRACTITIONER

## 2024-09-28 PROCEDURE — 6360000002 HC RX W HCPCS: Performed by: INTERNAL MEDICINE

## 2024-09-28 PROCEDURE — 6370000000 HC RX 637 (ALT 250 FOR IP): Performed by: INTERNAL MEDICINE

## 2024-09-28 PROCEDURE — 2580000003 HC RX 258: Performed by: NURSE PRACTITIONER

## 2024-09-28 PROCEDURE — 94640 AIRWAY INHALATION TREATMENT: CPT

## 2024-09-28 PROCEDURE — 2580000003 HC RX 258: Performed by: SURGERY

## 2024-09-28 PROCEDURE — 85027 COMPLETE CBC AUTOMATED: CPT

## 2024-09-28 PROCEDURE — 82962 GLUCOSE BLOOD TEST: CPT

## 2024-09-28 PROCEDURE — 6370000000 HC RX 637 (ALT 250 FOR IP): Performed by: NURSE PRACTITIONER

## 2024-09-28 PROCEDURE — 94003 VENT MGMT INPAT SUBQ DAY: CPT

## 2024-09-28 PROCEDURE — 36415 COLL VENOUS BLD VENIPUNCTURE: CPT

## 2024-09-28 PROCEDURE — 80053 COMPREHEN METABOLIC PANEL: CPT

## 2024-09-28 PROCEDURE — 2500000003 HC RX 250 WO HCPCS: Performed by: NURSE PRACTITIONER

## 2024-09-28 RX ORDER — GLYCOPYRROLATE 0.2 MG/ML
0.2 INJECTION INTRAMUSCULAR; INTRAVENOUS EVERY 4 HOURS PRN
Status: DISCONTINUED | OUTPATIENT
Start: 2024-09-28 | End: 2024-09-28 | Stop reason: HOSPADM

## 2024-09-28 RX ORDER — MORPHINE SULFATE 4 MG/ML
4 INJECTION, SOLUTION INTRAMUSCULAR; INTRAVENOUS
Status: DISCONTINUED | OUTPATIENT
Start: 2024-09-28 | End: 2024-09-28

## 2024-09-28 RX ORDER — LORAZEPAM 2 MG/ML
2 INJECTION INTRAMUSCULAR EVERY 30 MIN PRN
Status: DISCONTINUED | OUTPATIENT
Start: 2024-09-28 | End: 2024-09-28 | Stop reason: HOSPADM

## 2024-09-28 RX ORDER — SODIUM CHLORIDE, SODIUM LACTATE, POTASSIUM CHLORIDE, AND CALCIUM CHLORIDE .6; .31; .03; .02 G/100ML; G/100ML; G/100ML; G/100ML
500 INJECTION, SOLUTION INTRAVENOUS ONCE
Status: COMPLETED | OUTPATIENT
Start: 2024-09-28 | End: 2024-09-28

## 2024-09-28 RX ORDER — ONDANSETRON 2 MG/ML
4 INJECTION INTRAMUSCULAR; INTRAVENOUS EVERY 6 HOURS PRN
Status: DISCONTINUED | OUTPATIENT
Start: 2024-09-28 | End: 2024-09-28 | Stop reason: HOSPADM

## 2024-09-28 RX ORDER — MORPHINE SULFATE 4 MG/ML
4 INJECTION, SOLUTION INTRAMUSCULAR; INTRAVENOUS
Status: DISCONTINUED | OUTPATIENT
Start: 2024-09-28 | End: 2024-09-28 | Stop reason: HOSPADM

## 2024-09-28 RX ORDER — MORPHINE SULFATE 2 MG/ML
2 INJECTION, SOLUTION INTRAMUSCULAR; INTRAVENOUS
Status: DISCONTINUED | OUTPATIENT
Start: 2024-09-28 | End: 2024-09-28

## 2024-09-28 RX ORDER — MORPHINE SULFATE 2 MG/ML
2 INJECTION, SOLUTION INTRAMUSCULAR; INTRAVENOUS EVERY 30 MIN PRN
Status: DISCONTINUED | OUTPATIENT
Start: 2024-09-28 | End: 2024-09-28 | Stop reason: HOSPADM

## 2024-09-28 RX ORDER — LORAZEPAM 2 MG/ML
1 CONCENTRATE ORAL
Status: DISCONTINUED | OUTPATIENT
Start: 2024-09-28 | End: 2024-09-28

## 2024-09-28 RX ADMIN — ARFORMOTEROL TARTRATE: 15 SOLUTION RESPIRATORY (INHALATION) at 07:37

## 2024-09-28 RX ADMIN — Medication: at 07:43

## 2024-09-28 RX ADMIN — ENOXAPARIN SODIUM 30 MG: 100 INJECTION SUBCUTANEOUS at 07:42

## 2024-09-28 RX ADMIN — SODIUM CHLORIDE, POTASSIUM CHLORIDE, SODIUM LACTATE AND CALCIUM CHLORIDE 500 ML: 600; 310; 30; 20 INJECTION, SOLUTION INTRAVENOUS at 00:32

## 2024-09-28 RX ADMIN — SODIUM CHLORIDE, PRESERVATIVE FREE 10 ML: 5 INJECTION INTRAVENOUS at 07:43

## 2024-09-28 RX ADMIN — Medication 1 AMPULE: at 08:35

## 2024-09-28 RX ADMIN — CHLORHEXIDINE GLUCONATE 15 ML: 1.2 RINSE ORAL at 07:43

## 2024-09-28 RX ADMIN — DEXMEDETOMIDINE HYDROCHLORIDE 0.2 MCG/KG/HR: 400 INJECTION, SOLUTION INTRAVENOUS at 06:34

## 2024-09-28 RX ADMIN — FENTANYL CITRATE 50 MCG: 50 INJECTION INTRAMUSCULAR; INTRAVENOUS at 12:50

## 2024-09-28 RX ADMIN — MORPHINE SULFATE 4 MG: 4 INJECTION, SOLUTION INTRAMUSCULAR; INTRAVENOUS at 12:18

## 2024-09-28 RX ADMIN — MORPHINE SULFATE 2 MG: 2 INJECTION, SOLUTION INTRAMUSCULAR; INTRAVENOUS at 12:29

## 2024-09-28 RX ADMIN — GLYCOPYRROLATE 0.2 MG: 0.2 INJECTION INTRAMUSCULAR; INTRAVENOUS at 12:18

## 2024-09-28 RX ADMIN — POLYETHYLENE GLYCOL 3350 17 G: 17 POWDER, FOR SOLUTION ORAL at 07:42

## 2024-09-28 ASSESSMENT — PAIN SCALES - GENERAL
PAINLEVEL_OUTOF10: 0

## 2024-09-28 ASSESSMENT — PULMONARY FUNCTION TESTS
PIF_VALUE: 22
PIF_VALUE: 23
PIF_VALUE: 20
PIF_VALUE: 12

## 2024-09-28 NOTE — PROGRESS NOTES
ICU Progress Note        Subjective:    - continues to be on the ventilator.    - on the ventilator. Vascular planning on repeating the CT today.    - on the vent. Had CTA earlier, shows massive pleural effusion LEFT > right with atelectasis.    - Left chest tube placed yesterday. On vent. SAT/SBT this am.    - Extubated yesterday morning but by afternoon unable to clear secretions hence re-intubated.    - Continues to be on the ventilator.     HPI  89 y/o male PMHx of Aflutter and  HTN presented to ED via EMS with progressive shortness of breath on exertion over past 1.5 weeks. Initial SpO2 81-83 % per EMS, placed on CPAP. + Tachycardia (100-110s), B/P 158/80. CXR w/ widening of the mediastinum concerning for aneurysm or dissection.  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  He was recently started on Eliquis 3 days prior by PCP unclear reason possible A-fib/a flutter?  Vascular surgery was consulted initial plan was for medical management with impulse control,d/w vascular surgery will administer PCC due to high risk of further bleeding. While in ED patient had acute neurochanges/AMS necessitating repeat imaging, CTH/CTA H/N-no flow-limiting stenosis, approximately 65% stenosis of the proximal right ICA.  Vascular surgery at bedside evaluated patient due to concern for further decompensation after discussion with patient and family taken emergently to the OR for emergent TEVAR     Vital Signs:    /69   Pulse (!) 115   Temp 98.4 °F (36.9 °C) (Axillary)   Resp 23   Ht 1.905 m (6' 3\")   Wt 109.7 kg (241 lb 13.5 oz)   SpO2 95%   BMI 30.23 kg/m²             Temp (24hrs), Av.2 °F (36.8 °C), Min:96.8 °F (36 °C), Max:99 °F (37.2 °C)       Intake/Output:   Last shift:      701 - 1900  In: -   Out: 60 [Urine:60]  Last 3 shifts: 1901 -  0700  In: 4263.3 [I.V.:1288]  Out: 1610 [Urine:1610]    Intake/Output Summary (Last 24 hours) at 2024  and what to expect. I answered all the questions. I offered  which they declined.   - DNR  - Pursue comfort measures.     Critical Care Time: 40 minutes    Steven Castro MD,HOWIE, FCCM, FCCP, ATSF, FACP, DAABIP  Interventional Pulmonology/Critical Care Medicine  [facility] Critical [facility]  [facility]

## 2024-09-28 NOTE — PLAN OF CARE
Problem: Safety - Medical Restraint  Goal: Remains free of injury from restraints (Restraint for Interference with Medical Device)  Description: INTERVENTIONS:  1. Determine that other, less restrictive measures have been tried or would not be effective before applying the restraint  2. Evaluate the patient's condition at the time of restraint application  3. Inform patient/family regarding the reason for restraint  4. Q2H: Monitor safety, psychosocial status, comfort, nutrition and hydration  Outcome: Completed

## 2024-09-28 NOTE — PROGRESS NOTES
Bedside report received from KARL Sandy. Patient on vent, spontaneous mode, with 0.2 mcg precedex, following some commands. Eyes open to voice. Lungs diminished, pulses palpable, bowel sounds active (pt had small bm, patient bathed). PT voiding via urinal pillo urine. Residual with  mL. Feeds held for possible extubation.       0900 - Patient tachycardic into 120s on spontaneous mode. Pt switched back to a rate per intensivist.  HR more stable now.    1145 - Dr. Castro at bedside to speak with family. Patient to be made comfort care.     1340 - [facility] called voicemail left

## 2024-09-28 NOTE — PROGRESS NOTES
Spiritual Health History and Assessment/Progress Note  [facility]    Grief, Loss, and Adjustments,  , Death, Grief and loss,      Name: Micheal Ohara MRN: [id number]    Age: 88 y.o.     Sex: male   Language: English   Alevism: None   Dissecting aneurysm of aorta (HCC)     Date: 2024            Total Time Calculated: 9 min              Spiritual Assessment began in [facility] CRITICAL CARE        Referral/Consult From: Nurse   Encounter Overview/Reason: Grief, Loss, and Adjustments  Service Provided For: Family    Antonette, Belief, Meaning:   Patient unable to assess at this time  Family/Friends Other: unable to assess      Importance and Influence:  Patient unable to assess at this time  Family/Friends have no beliefs influential to healthcare decision-making identified during this visit    Community:  Patient   Family/Friends feel well-supported. Support system includes: Children and Extended family    Assessment and Plan of Care:     Patient Interventions include:   Family/Friends Interventions include: Facilitated expression of thoughts and feelings and Affirmed coping skills/support systems    Patient Plan of Care:   Family/Friends Plan of Care: Spiritual Care available upon further referral    Responded to page to CCU when patient .  Patient's wife and two daughters were present.  Daughter expressed they are in shock.   offered calming presence and assurance of prayer.    CHEIKH Moore, BCC, Staff   [facility]     Paging Service [phone])      Electronically signed by Chaplain CHRIS [facility] BCC on 2024 at 1:50 PM     PRE-OP DIAGNOSIS:  Scalp mass 17-Oct-2023 12:04:20  Nicole Fontenot   PRE-OP DIAGNOSIS:  Scalp mass 17-Oct-2023 12:04:20  Nicole Fontenot

## 2024-09-28 NOTE — PROGRESS NOTES
Name: Micheal Ohara  Date: 9/28/2024  Location: ICU    Physical exam findings:   Neuro: Pupils fixed and dilated.  No corneal reflex.  CVS: Carotid, femoral, or radial pulses not palpable. No cardiac sounds by ascultation.  Resp: No visible inspiration. No respiratory sounds on ascultation.     Date of Death: 9/28/2024  Time of Death: 1:45 PM  Primary Cause of Death - Aortic aneurysm     Family at bedside      Steven Castro MD  2:36 PM  9/28/2024

## 2024-09-28 NOTE — PROGRESS NOTES
09/28/24 0533 09/28/24 0548   Patient Observation   Respirations 21  --    SpO2 96 % 97 %   Vent Information   Equipment Changed Humidification  --    Vent Mode CPAP/PS CPAP/PS   Ventilator Settings   FiO2  40 % 40 %   PEEP/CPAP (cmH2O) 5 5   Pressure Support (cm H2O) 5 cm H2O 5 cm H2O   Vent Patient Data (Readings)   Vt (Measured)  --  471 mL   Peak Inspiratory Pressure (cmH2O)  --  22 cmH2O   Mean Airway Pressure (cmH2O)  --  9.62 cmH20

## 2024-09-30 NOTE — PROGRESS NOTES
Quality: Chart reviewed for death and restraints. Restraints noted during hospitalization. Restraints not a contributing factor in patient death. Documented for tracking according to CMS guidelines on 9/30/24 at 1605.

## 2025-07-21 NOTE — CONSULTS
[facility]              [address] [place] [address]                              CONSULTATION      PATIENT NAME: MIHAELA CRUZ               : 1935  MED REC NO: [id number]                       ROOM: OR  ACCOUNT NO: [id number]                       ADMIT DATE: 2024  PROVIDER: Carroll Lomax MD    DATE OF SERVICE:  2024    ATTENDING PHYSICIAN:  SENAIT BRIGGS    REASON FOR CONSULTATION:  Thoracic aortic aneurysm, intramural hematoma or aneurysm rupture.    HISTORY OF PRESENT ILLNESS:  The patient is an 88-year-old gentleman, who presented to the hospital with shortness of breath for about a week.  His primary care physician apparently put him on Eliquis for some reason and sent him to the emergency room.  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  There is some pleural fluid that is possibly related to effusion versus bleed in this area.  Despite all of these findings, the patient has minimal symptoms with no pain and is alert.  During the process of his admission, due to his advanced age, it was decided to try to treat this nonoperatively if possible or at least allow small amount of time to stabilize the aorta, but there is a question of the patient becoming altered, which has since resolved.  His scan has been repeated, which shows high density contrast within the intramural hematoma area, question whether the patient is worsening.    PAST MEDICAL HISTORY:  Has been reviewed consists of history of atrial flutter, basal cell carcinoma.    CURRENT MEDICATIONS:  Include baby aspirin.  He was recently started on Eliquis, eyedrops, Toprol-XL.    SOCIAL HISTORY:  Negative for tobacco or daily alcohol.    REVIEW OF SYSTEMS:  Currently, negative.    PHYSICAL EXAMINATION:  VITAL SIGNS:   (0) No aphasia; normal

## (undated) PROCEDURE — 02VW3DZ RESTRICTION OF THORACIC AORTA, DESCENDING WITH INTRALUMINAL DEVICE, PERCUTANEOUS APPROACH: ICD-10-PCS

## (undated) DEVICE — OR HYBRID-MRMC: Brand: MEDLINE INDUSTRIES, INC.

## (undated) DEVICE — BAG SPEC BIOHZRD 10 X 10 IN --

## (undated) DEVICE — Device: Brand: MEDEX

## (undated) DEVICE — CUFF BLD PRSS AD SZ 11 FIT 25-34CM SFT 2 TB W/ M SCR CONN

## (undated) DEVICE — SYRINGE ANGIO CNTRST DEL 20 CC POLYCARB LIGHT GRN MEDALLION

## (undated) DEVICE — GUIDEWIRE VASC L260CM DIA0.035IN COIL L15CM FLX TIP L4CM

## (undated) DEVICE — Device

## (undated) DEVICE — LIQUIBAND RAPID ADHESIVE 36/CS 0.8ML: Brand: MEDLINE

## (undated) DEVICE — BOWL MED L 32OZ PLAS W/ MOLD GRAD EZ OPN PEEL PCH

## (undated) DEVICE — GUIDEWIRE VASC L260CM DIA0.035IN TIP L7CM PTFE S STL STR

## (undated) DEVICE — SUTURE MONOCRYL SZ 4-0 L27IN ABSRB UD L19MM PS-2 1/2 CIR PRIM Y426H

## (undated) DEVICE — BLANKET WRM W25XL64IN NONWOVEN SFT LTWT PLIABLE HYPR

## (undated) DEVICE — SOLUTION IV 100ML 0.9% SOD CHL PLAS CONT USP VIAFLX 1 PER

## (undated) DEVICE — SHEATH 6FR 11CM BRITETIP

## (undated) DEVICE — 1200 GUARD II KIT W/5MM TUBE W/O VAC TUBE: Brand: GUARDIAN

## (undated) DEVICE — 1LYRTR 16FR10ML100%SIL UMS SNP: Brand: MEDLINE INDUSTRIES, INC.

## (undated) DEVICE — 3M™ IOBAN™ 2 ANTIMICROBIAL INCISE DRAPE 6648EZ: Brand: IOBAN™ 2

## (undated) DEVICE — CATH IV AUTOGRD BC PNK 20GA 25 -- INSYTE

## (undated) DEVICE — NEONATAL-ADULT SPO2 SENSOR: Brand: NELLCOR

## (undated) DEVICE — NEEDLE HYPO 18GA L1.5IN PNK S STL HUB POLYPR SHLD REG BVL

## (undated) DEVICE — SHEATH 8FR 11CM BRITETIP

## (undated) DEVICE — SYRINGE KIT,PACKAGED,,150FT,MK 7(ANGIO-ARTERION, 150ML SYR KIT W/QFT,MC)(60729385): Brand: MEDRAD® MARK 7 ARTERION DISPOSABLE SYRINGE 150 ML WITH QUICK FILL TUBE

## (undated) DEVICE — INFLATION DEVICE: Brand: ENCORE™ 26

## (undated) DEVICE — GLOVE SURG SZ 8 L12IN FNGR THK94MIL STD WHT LTX FREE

## (undated) DEVICE — CONTAINER SPEC 20 ML LID NEUT BUFF FORMALIN 10 % POLYPR STS

## (undated) DEVICE — TOWEL 4 PLY TISS 19X30 SUE WHT

## (undated) DEVICE — MICROPUNCTURE INTRODUCER SET SILHOUETTE TRANSITIONLESS WITH STAINLESS STEEL WIRE GUIDE: Brand: MICROPUNCTURE

## (undated) DEVICE — KENDALL RADIOLUCENT FOAM MONITORING ELECTRODE RECTANGULAR SHAPE: Brand: KENDALL

## (undated) DEVICE — APPLICATOR MEDICATED 26 CC SOLUTION HI LT ORNG CHLORAPREP

## (undated) DEVICE — BASIN EMESIS 500CC ROSE 250/CS 60/PLT: Brand: MEDEGEN MEDICAL PRODUCTS, LLC

## (undated) DEVICE — SOLUTION IV 1000ML 0.9% SOD CHL PH 5 INJ USP VIAFLX PLAS

## (undated) DEVICE — PERCLOSE PROGLIDE™ SUTURE-MEDIATED CLOSURE SYSTEM: Brand: PERCLOSE PROGLIDE™

## (undated) DEVICE — DECANTER BAG 9": Brand: MEDLINE INDUSTRIES, INC.

## (undated) DEVICE — PROVE COVER: Brand: UNBRANDED

## (undated) DEVICE — FORCEPS BX L240CM JAW DIA2.8MM L CAP W/ NDL MIC MESH TOOTH

## (undated) DEVICE — (D)SYR 10ML 1/5ML GRAD NSAF -- PKGING CHANGE USE ITEM 338027

## (undated) DEVICE — SET ADMIN 16ML TBNG L100IN 2 Y INJ SITE IV PIGGY BK DISP

## (undated) DEVICE — SYR 3ML LL TIP 1/10ML GRAD --

## (undated) DEVICE — SOLIDIFIER MEDC 1200ML -- CONVERT TO 356117

## (undated) DEVICE — RADIFOCUS GLIDEWIRE: Brand: GLIDEWIRE

## (undated) DEVICE — CATHETER ANGIO 5FR L100CM GWIRE 0.038IN BERN NONBRAIDED HI

## (undated) DEVICE — TUBING HI PRSS INJ 48IN 1200PSI FLX BRAID POLYUR CNTRST

## (undated) DEVICE — Z DISCONTINUED PER MEDLINE LINE GAS SAMPLING O2/CO2 LNG AD 13 FT NSL W/ TBNG FILTERLINE

## (undated) DEVICE — GOWN,SIRUS,NONRNF,SETINSLV,2XL,18/CS: Brand: MEDLINE